# Patient Record
Sex: FEMALE | Race: WHITE | HISPANIC OR LATINO
[De-identification: names, ages, dates, MRNs, and addresses within clinical notes are randomized per-mention and may not be internally consistent; named-entity substitution may affect disease eponyms.]

---

## 2017-03-20 ENCOUNTER — RESULT REVIEW (OUTPATIENT)
Age: 47
End: 2017-03-20

## 2017-03-23 ENCOUNTER — APPOINTMENT (OUTPATIENT)
Dept: MAMMOGRAPHY | Facility: CLINIC | Age: 47
End: 2017-03-23

## 2017-03-23 ENCOUNTER — OUTPATIENT (OUTPATIENT)
Dept: OUTPATIENT SERVICES | Facility: HOSPITAL | Age: 47
LOS: 1 days | End: 2017-03-23
Payer: COMMERCIAL

## 2017-03-23 DIAGNOSIS — Z00.8 ENCOUNTER FOR OTHER GENERAL EXAMINATION: ICD-10-CM

## 2017-03-23 PROCEDURE — 77067 SCR MAMMO BI INCL CAD: CPT

## 2017-03-23 PROCEDURE — 77063 BREAST TOMOSYNTHESIS BI: CPT

## 2017-11-24 ENCOUNTER — OUTPATIENT (OUTPATIENT)
Dept: OUTPATIENT SERVICES | Facility: HOSPITAL | Age: 47
LOS: 1 days | Discharge: HOME | End: 2017-11-24
Payer: SUBSIDIZED

## 2017-11-24 DIAGNOSIS — R13.19 OTHER DYSPHAGIA: ICD-10-CM

## 2017-11-24 DIAGNOSIS — R13.13 DYSPHAGIA, PHARYNGEAL PHASE: ICD-10-CM

## 2017-11-24 PROCEDURE — 74220 X-RAY XM ESOPHAGUS 1CNTRST: CPT | Mod: 26

## 2018-01-08 ENCOUNTER — EMERGENCY (EMERGENCY)
Facility: HOSPITAL | Age: 48
LOS: 0 days | Discharge: HOME | End: 2018-01-08
Attending: EMERGENCY MEDICINE | Admitting: EMERGENCY MEDICINE
Payer: MEDICAID

## 2018-01-08 VITALS — HEIGHT: 65 IN | WEIGHT: 190.04 LBS

## 2018-01-08 VITALS
SYSTOLIC BLOOD PRESSURE: 99 MMHG | TEMPERATURE: 98 F | OXYGEN SATURATION: 98 % | HEART RATE: 87 BPM | DIASTOLIC BLOOD PRESSURE: 51 MMHG | RESPIRATION RATE: 15 BRPM

## 2018-01-08 DIAGNOSIS — D64.9 ANEMIA, UNSPECIFIED: ICD-10-CM

## 2018-01-08 DIAGNOSIS — R50.9 FEVER, UNSPECIFIED: ICD-10-CM

## 2018-01-08 DIAGNOSIS — N39.0 URINARY TRACT INFECTION, SITE NOT SPECIFIED: ICD-10-CM

## 2018-01-08 LAB
ALBUMIN SERPL ELPH-MCNC: 3.5 G/DL — SIGNIFICANT CHANGE UP (ref 3.3–5)
ALP SERPL-CCNC: 108 U/L — SIGNIFICANT CHANGE UP (ref 40–120)
ALT FLD-CCNC: 62 U/L — SIGNIFICANT CHANGE UP (ref 12–78)
ANION GAP SERPL CALC-SCNC: 8 MMOL/L — SIGNIFICANT CHANGE UP (ref 5–17)
APPEARANCE UR: CLEAR — SIGNIFICANT CHANGE UP
AST SERPL-CCNC: 56 U/L — HIGH (ref 15–37)
BACTERIA # UR AUTO: (no result)
BASOPHILS # BLD AUTO: 0.1 K/UL — SIGNIFICANT CHANGE UP (ref 0–0.2)
BILIRUB SERPL-MCNC: 0.5 MG/DL — SIGNIFICANT CHANGE UP (ref 0.2–1.2)
BILIRUB UR-MCNC: NEGATIVE — SIGNIFICANT CHANGE UP
BUN SERPL-MCNC: 11 MG/DL — SIGNIFICANT CHANGE UP (ref 7–23)
CALCIUM SERPL-MCNC: 8.5 MG/DL — SIGNIFICANT CHANGE UP (ref 8.5–10.1)
CHLORIDE SERPL-SCNC: 103 MMOL/L — SIGNIFICANT CHANGE UP (ref 96–108)
CO2 SERPL-SCNC: 23 MMOL/L — SIGNIFICANT CHANGE UP (ref 22–31)
COLOR SPEC: YELLOW — SIGNIFICANT CHANGE UP
COMMENT - URINE: SIGNIFICANT CHANGE UP
CREAT SERPL-MCNC: 0.77 MG/DL — SIGNIFICANT CHANGE UP (ref 0.5–1.3)
DIFF PNL FLD: (no result)
EOSINOPHIL # BLD AUTO: 0 K/UL — SIGNIFICANT CHANGE UP (ref 0–0.5)
GLUCOSE SERPL-MCNC: 104 MG/DL — HIGH (ref 70–99)
GLUCOSE UR QL: NEGATIVE MG/DL — SIGNIFICANT CHANGE UP
HCT VFR BLD CALC: 38.2 % — SIGNIFICANT CHANGE UP (ref 34.5–45)
HGB BLD-MCNC: 12.5 G/DL — SIGNIFICANT CHANGE UP (ref 11.5–15.5)
HYPERCHROMIA BLD QL AUTO: SLIGHT — SIGNIFICANT CHANGE UP
KETONES UR-MCNC: (no result)
LACTATE SERPL-SCNC: 1.2 MMOL/L — SIGNIFICANT CHANGE UP (ref 0.7–2)
LEUKOCYTE ESTERASE UR-ACNC: (no result)
LYMPHOCYTES # BLD AUTO: 1.2 K/UL — SIGNIFICANT CHANGE UP (ref 1–3.3)
LYMPHOCYTES # BLD AUTO: 9 % — LOW (ref 13–44)
MACROCYTES BLD QL: SLIGHT — SIGNIFICANT CHANGE UP
MANUAL DIF COMMENT BLD-IMP: SIGNIFICANT CHANGE UP
MCHC RBC-ENTMCNC: 29.1 PG — SIGNIFICANT CHANGE UP (ref 27–34)
MCHC RBC-ENTMCNC: 32.8 GM/DL — SIGNIFICANT CHANGE UP (ref 32–36)
MCV RBC AUTO: 88.7 FL — SIGNIFICANT CHANGE UP (ref 80–100)
MONOCYTES # BLD AUTO: 1.3 K/UL — HIGH (ref 0–0.9)
MONOCYTES NFR BLD AUTO: 3 % — SIGNIFICANT CHANGE UP (ref 2–14)
NEUTROPHILS # BLD AUTO: 16.2 K/UL — HIGH (ref 1.8–7.4)
NEUTROPHILS NFR BLD AUTO: 85 % — HIGH (ref 43–77)
NITRITE UR-MCNC: NEGATIVE — SIGNIFICANT CHANGE UP
PH UR: 6 — SIGNIFICANT CHANGE UP (ref 5–8)
PLAT MORPH BLD: NORMAL — SIGNIFICANT CHANGE UP
PLATELET # BLD AUTO: 223 K/UL — SIGNIFICANT CHANGE UP (ref 150–400)
POLYCHROMASIA BLD QL SMEAR: SLIGHT — SIGNIFICANT CHANGE UP
POTASSIUM SERPL-MCNC: 3.3 MMOL/L — LOW (ref 3.5–5.3)
POTASSIUM SERPL-SCNC: 3.3 MMOL/L — LOW (ref 3.5–5.3)
PROT SERPL-MCNC: 8.3 GM/DL — SIGNIFICANT CHANGE UP (ref 6–8.3)
PROT UR-MCNC: 30 MG/DL
RBC # BLD: 4.3 M/UL — SIGNIFICANT CHANGE UP (ref 3.8–5.2)
RBC # FLD: 18.9 % — HIGH (ref 10.3–14.5)
RBC BLD AUTO: SIGNIFICANT CHANGE UP
RBC CASTS # UR COMP ASSIST: (no result) /HPF (ref 0–4)
SODIUM SERPL-SCNC: 134 MMOL/L — LOW (ref 135–145)
SP GR SPEC: 1.01 — SIGNIFICANT CHANGE UP (ref 1.01–1.02)
UROBILINOGEN FLD QL: 1 MG/DL
VARIANT LYMPHS # BLD: 3 % — SIGNIFICANT CHANGE UP (ref 0–6)
WBC # BLD: 18.8 K/UL — HIGH (ref 3.8–10.5)
WBC # FLD AUTO: 18.8 K/UL — HIGH (ref 3.8–10.5)
WBC UR QL: SIGNIFICANT CHANGE UP

## 2018-01-08 PROCEDURE — 99285 EMERGENCY DEPT VISIT HI MDM: CPT

## 2018-01-08 PROCEDURE — 74177 CT ABD & PELVIS W/CONTRAST: CPT | Mod: 26

## 2018-01-08 PROCEDURE — 93010 ELECTROCARDIOGRAM REPORT: CPT

## 2018-01-08 PROCEDURE — 71046 X-RAY EXAM CHEST 2 VIEWS: CPT | Mod: 26

## 2018-01-08 RX ORDER — ACETAMINOPHEN 500 MG
1000 TABLET ORAL ONCE
Qty: 0 | Refills: 0 | Status: COMPLETED | OUTPATIENT
Start: 2018-01-08 | End: 2018-01-08

## 2018-01-08 RX ORDER — CEFTRIAXONE 500 MG/1
1 INJECTION, POWDER, FOR SOLUTION INTRAMUSCULAR; INTRAVENOUS ONCE
Qty: 0 | Refills: 0 | Status: DISCONTINUED | OUTPATIENT
Start: 2018-01-08 | End: 2018-01-08

## 2018-01-08 RX ORDER — SODIUM CHLORIDE 9 MG/ML
3 INJECTION INTRAMUSCULAR; INTRAVENOUS; SUBCUTANEOUS ONCE
Qty: 0 | Refills: 0 | Status: COMPLETED | OUTPATIENT
Start: 2018-01-08 | End: 2018-01-08

## 2018-01-08 RX ORDER — CEFTRIAXONE 500 MG/1
1000 INJECTION, POWDER, FOR SOLUTION INTRAMUSCULAR; INTRAVENOUS ONCE
Qty: 0 | Refills: 0 | Status: COMPLETED | OUTPATIENT
Start: 2018-01-08 | End: 2018-01-08

## 2018-01-08 RX ORDER — MORPHINE SULFATE 50 MG/1
4 CAPSULE, EXTENDED RELEASE ORAL ONCE
Qty: 0 | Refills: 0 | Status: DISCONTINUED | OUTPATIENT
Start: 2018-01-08 | End: 2018-01-08

## 2018-01-08 RX ORDER — SODIUM CHLORIDE 9 MG/ML
500 INJECTION INTRAMUSCULAR; INTRAVENOUS; SUBCUTANEOUS
Qty: 0 | Refills: 0 | Status: COMPLETED | OUTPATIENT
Start: 2018-01-08 | End: 2018-01-08

## 2018-01-08 RX ORDER — SODIUM CHLORIDE 9 MG/ML
500 INJECTION INTRAMUSCULAR; INTRAVENOUS; SUBCUTANEOUS ONCE
Qty: 0 | Refills: 0 | Status: COMPLETED | OUTPATIENT
Start: 2018-01-08 | End: 2018-01-08

## 2018-01-08 RX ORDER — CEPHALEXIN 500 MG
1 CAPSULE ORAL
Qty: 20 | Refills: 0 | OUTPATIENT
Start: 2018-01-08 | End: 2018-01-17

## 2018-01-08 RX ADMIN — SODIUM CHLORIDE 2000 MILLILITER(S): 9 INJECTION INTRAMUSCULAR; INTRAVENOUS; SUBCUTANEOUS at 08:25

## 2018-01-08 RX ADMIN — MORPHINE SULFATE 4 MILLIGRAM(S): 50 CAPSULE, EXTENDED RELEASE ORAL at 09:40

## 2018-01-08 RX ADMIN — SODIUM CHLORIDE 2000 MILLILITER(S): 9 INJECTION INTRAMUSCULAR; INTRAVENOUS; SUBCUTANEOUS at 08:27

## 2018-01-08 RX ADMIN — CEFTRIAXONE 1000 MILLIGRAM(S): 500 INJECTION, POWDER, FOR SOLUTION INTRAMUSCULAR; INTRAVENOUS at 09:07

## 2018-01-08 RX ADMIN — SODIUM CHLORIDE 2000 MILLILITER(S): 9 INJECTION INTRAMUSCULAR; INTRAVENOUS; SUBCUTANEOUS at 12:05

## 2018-01-08 RX ADMIN — SODIUM CHLORIDE 3 MILLILITER(S): 9 INJECTION INTRAMUSCULAR; INTRAVENOUS; SUBCUTANEOUS at 08:54

## 2018-01-08 RX ADMIN — Medication 1000 MILLIGRAM(S): at 09:06

## 2018-01-08 RX ADMIN — SODIUM CHLORIDE 2000 MILLILITER(S): 9 INJECTION INTRAMUSCULAR; INTRAVENOUS; SUBCUTANEOUS at 08:28

## 2018-01-08 RX ADMIN — SODIUM CHLORIDE 500 MILLILITER(S): 9 INJECTION INTRAMUSCULAR; INTRAVENOUS; SUBCUTANEOUS at 12:06

## 2018-01-08 RX ADMIN — SODIUM CHLORIDE 2000 MILLILITER(S): 9 INJECTION INTRAMUSCULAR; INTRAVENOUS; SUBCUTANEOUS at 08:26

## 2018-01-08 RX ADMIN — MORPHINE SULFATE 4 MILLIGRAM(S): 50 CAPSULE, EXTENDED RELEASE ORAL at 09:06

## 2018-01-08 NOTE — ED PROVIDER NOTE - MEDICAL DECISION MAKING DETAILS
46 yo female presents with fever and abdominal pain radiating to the L flank. R/o ppyelo. Labs, fluids, IV abx, ua, CT abd. -Alfredo Cruz PA-C

## 2018-01-08 NOTE — ED ADULT TRIAGE NOTE - CHIEF COMPLAINT QUOTE
Pt states fever, cough, sore throat and abdominal pain x 3 days. Took Tylenol last night with no relief

## 2018-01-08 NOTE — ED PROVIDER NOTE - ATTENDING CONTRIBUTION TO CARE
48 yo female pw fever and LLQ abdominal pain.  TTP of LLQ of abdomen. CT scan negative patient treated for UTI.

## 2018-01-08 NOTE — ED PROVIDER NOTE - OBJECTIVE STATEMENT
48 yo female with no significant PMH presents with fever, abdominal pain. Pt states symptoms started with fever of 102F friday going up to 107F with LLQ pain radiating to the L flank. Pt states also sore throat with lower substernal cp. Denies chills, sweating, sob, n/v/d/c, cough, hematuria, dysuria, frequency.

## 2018-01-09 LAB
CULTURE RESULTS: SIGNIFICANT CHANGE UP
SPECIMEN SOURCE: SIGNIFICANT CHANGE UP

## 2018-01-13 LAB
CULTURE RESULTS: SIGNIFICANT CHANGE UP
CULTURE RESULTS: SIGNIFICANT CHANGE UP
SPECIMEN SOURCE: SIGNIFICANT CHANGE UP
SPECIMEN SOURCE: SIGNIFICANT CHANGE UP

## 2018-02-07 ENCOUNTER — INPATIENT (INPATIENT)
Facility: HOSPITAL | Age: 48
LOS: 1 days | Discharge: HOME | End: 2018-02-09
Attending: FAMILY MEDICINE | Admitting: FAMILY MEDICINE
Payer: MEDICAID

## 2018-02-07 VITALS — WEIGHT: 190.04 LBS

## 2018-02-07 LAB
ALBUMIN SERPL ELPH-MCNC: 3.7 G/DL — SIGNIFICANT CHANGE UP (ref 3.3–5)
ALP SERPL-CCNC: 111 U/L — SIGNIFICANT CHANGE UP (ref 40–120)
ALT FLD-CCNC: 68 U/L — SIGNIFICANT CHANGE UP (ref 12–78)
ANION GAP SERPL CALC-SCNC: 7 MMOL/L — SIGNIFICANT CHANGE UP (ref 5–17)
AST SERPL-CCNC: 67 U/L — HIGH (ref 15–37)
BASOPHILS # BLD AUTO: 0.1 K/UL — SIGNIFICANT CHANGE UP (ref 0–0.2)
BASOPHILS NFR BLD AUTO: 0.5 % — SIGNIFICANT CHANGE UP (ref 0–2)
BILIRUB SERPL-MCNC: 0.6 MG/DL — SIGNIFICANT CHANGE UP (ref 0.2–1.2)
BUN SERPL-MCNC: 9 MG/DL — SIGNIFICANT CHANGE UP (ref 7–23)
CALCIUM SERPL-MCNC: 8.8 MG/DL — SIGNIFICANT CHANGE UP (ref 8.5–10.1)
CHLORIDE SERPL-SCNC: 104 MMOL/L — SIGNIFICANT CHANGE UP (ref 96–108)
CO2 SERPL-SCNC: 24 MMOL/L — SIGNIFICANT CHANGE UP (ref 22–31)
CREAT SERPL-MCNC: 0.73 MG/DL — SIGNIFICANT CHANGE UP (ref 0.5–1.3)
EOSINOPHIL # BLD AUTO: 0.1 K/UL — SIGNIFICANT CHANGE UP (ref 0–0.5)
EOSINOPHIL NFR BLD AUTO: 0.5 % — SIGNIFICANT CHANGE UP (ref 0–6)
GLUCOSE SERPL-MCNC: 92 MG/DL — SIGNIFICANT CHANGE UP (ref 70–99)
HCT VFR BLD CALC: 40.6 % — SIGNIFICANT CHANGE UP (ref 34.5–45)
HGB BLD-MCNC: 13 G/DL — SIGNIFICANT CHANGE UP (ref 11.5–15.5)
LACTATE SERPL-SCNC: 0.7 MMOL/L — SIGNIFICANT CHANGE UP (ref 0.7–2)
LYMPHOCYTES # BLD AUTO: 1.8 K/UL — SIGNIFICANT CHANGE UP (ref 1–3.3)
LYMPHOCYTES # BLD AUTO: 14.9 % — SIGNIFICANT CHANGE UP (ref 13–44)
MCHC RBC-ENTMCNC: 29.6 PG — SIGNIFICANT CHANGE UP (ref 27–34)
MCHC RBC-ENTMCNC: 32 GM/DL — SIGNIFICANT CHANGE UP (ref 32–36)
MCV RBC AUTO: 92.5 FL — SIGNIFICANT CHANGE UP (ref 80–100)
MONOCYTES # BLD AUTO: 0.6 K/UL — SIGNIFICANT CHANGE UP (ref 0–0.9)
MONOCYTES NFR BLD AUTO: 5.3 % — SIGNIFICANT CHANGE UP (ref 2–14)
NEUTROPHILS # BLD AUTO: 9.4 K/UL — HIGH (ref 1.8–7.4)
NEUTROPHILS NFR BLD AUTO: 78.8 % — HIGH (ref 43–77)
PLATELET # BLD AUTO: 285 K/UL — SIGNIFICANT CHANGE UP (ref 150–400)
POTASSIUM SERPL-MCNC: 4 MMOL/L — SIGNIFICANT CHANGE UP (ref 3.5–5.3)
POTASSIUM SERPL-SCNC: 4 MMOL/L — SIGNIFICANT CHANGE UP (ref 3.5–5.3)
PROT SERPL-MCNC: 9.1 GM/DL — HIGH (ref 6–8.3)
RBC # BLD: 4.39 M/UL — SIGNIFICANT CHANGE UP (ref 3.8–5.2)
RBC # FLD: 17.2 % — HIGH (ref 10.3–14.5)
S PYO AG SPEC QL IA: NEGATIVE — SIGNIFICANT CHANGE UP
SODIUM SERPL-SCNC: 135 MMOL/L — SIGNIFICANT CHANGE UP (ref 135–145)
WBC # BLD: 12 K/UL — HIGH (ref 3.8–10.5)
WBC # FLD AUTO: 12 K/UL — HIGH (ref 3.8–10.5)

## 2018-02-07 PROCEDURE — 99285 EMERGENCY DEPT VISIT HI MDM: CPT

## 2018-02-07 PROCEDURE — 93010 ELECTROCARDIOGRAM REPORT: CPT

## 2018-02-07 PROCEDURE — 70491 CT SOFT TISSUE NECK W/DYE: CPT | Mod: 26

## 2018-02-07 RX ORDER — VANCOMYCIN HCL 1 G
1000 VIAL (EA) INTRAVENOUS ONCE
Qty: 0 | Refills: 0 | Status: COMPLETED | OUTPATIENT
Start: 2018-02-07 | End: 2018-02-07

## 2018-02-07 RX ORDER — AMPICILLIN SODIUM AND SULBACTAM SODIUM 250; 125 MG/ML; MG/ML
1.5 INJECTION, POWDER, FOR SUSPENSION INTRAMUSCULAR; INTRAVENOUS EVERY 6 HOURS
Qty: 0 | Refills: 0 | Status: DISCONTINUED | OUTPATIENT
Start: 2018-02-07 | End: 2018-02-07

## 2018-02-07 RX ORDER — SODIUM CHLORIDE 9 MG/ML
3 INJECTION INTRAMUSCULAR; INTRAVENOUS; SUBCUTANEOUS ONCE
Qty: 0 | Refills: 0 | Status: COMPLETED | OUTPATIENT
Start: 2018-02-07 | End: 2018-02-07

## 2018-02-07 RX ORDER — DEXAMETHASONE 0.5 MG/5ML
10 ELIXIR ORAL ONCE
Qty: 0 | Refills: 0 | Status: COMPLETED | OUTPATIENT
Start: 2018-02-07 | End: 2018-02-07

## 2018-02-07 RX ORDER — AMPICILLIN SODIUM AND SULBACTAM SODIUM 250; 125 MG/ML; MG/ML
3 INJECTION, POWDER, FOR SUSPENSION INTRAMUSCULAR; INTRAVENOUS EVERY 6 HOURS
Qty: 0 | Refills: 0 | Status: DISCONTINUED | OUTPATIENT
Start: 2018-02-07 | End: 2018-02-09

## 2018-02-07 RX ORDER — FERROUS SULFATE 325(65) MG
1 TABLET ORAL
Qty: 0 | Refills: 0 | COMMUNITY

## 2018-02-07 RX ORDER — ONDANSETRON 8 MG/1
4 TABLET, FILM COATED ORAL EVERY 6 HOURS
Qty: 0 | Refills: 0 | Status: DISCONTINUED | OUTPATIENT
Start: 2018-02-07 | End: 2018-02-09

## 2018-02-07 RX ORDER — SODIUM CHLORIDE 9 MG/ML
1000 INJECTION INTRAMUSCULAR; INTRAVENOUS; SUBCUTANEOUS
Qty: 0 | Refills: 0 | Status: DISCONTINUED | OUTPATIENT
Start: 2018-02-07 | End: 2018-02-09

## 2018-02-07 RX ORDER — SODIUM CHLORIDE 9 MG/ML
500 INJECTION INTRAMUSCULAR; INTRAVENOUS; SUBCUTANEOUS
Qty: 0 | Refills: 0 | Status: COMPLETED | OUTPATIENT
Start: 2018-02-07 | End: 2018-02-07

## 2018-02-07 RX ORDER — CEFAZOLIN SODIUM 1 G
2000 VIAL (EA) INJECTION ONCE
Qty: 0 | Refills: 0 | Status: COMPLETED | OUTPATIENT
Start: 2018-02-07 | End: 2018-02-07

## 2018-02-07 RX ORDER — OMEPRAZOLE 10 MG/1
1 CAPSULE, DELAYED RELEASE ORAL
Qty: 0 | Refills: 0 | COMMUNITY

## 2018-02-07 RX ADMIN — Medication 40 MILLIGRAM(S): at 18:18

## 2018-02-07 RX ADMIN — AMPICILLIN SODIUM AND SULBACTAM SODIUM 200 GRAM(S): 250; 125 INJECTION, POWDER, FOR SUSPENSION INTRAMUSCULAR; INTRAVENOUS at 21:53

## 2018-02-07 RX ADMIN — Medication 100 MILLIGRAM(S): at 10:43

## 2018-02-07 RX ADMIN — Medication 102 MILLIGRAM(S): at 10:37

## 2018-02-07 RX ADMIN — SODIUM CHLORIDE 2000 MILLILITER(S): 9 INJECTION INTRAMUSCULAR; INTRAVENOUS; SUBCUTANEOUS at 10:44

## 2018-02-07 RX ADMIN — SODIUM CHLORIDE 75 MILLILITER(S): 9 INJECTION INTRAMUSCULAR; INTRAVENOUS; SUBCUTANEOUS at 18:12

## 2018-02-07 RX ADMIN — SODIUM CHLORIDE 2000 MILLILITER(S): 9 INJECTION INTRAMUSCULAR; INTRAVENOUS; SUBCUTANEOUS at 10:42

## 2018-02-07 RX ADMIN — Medication 250 MILLIGRAM(S): at 18:18

## 2018-02-07 RX ADMIN — SODIUM CHLORIDE 3 MILLILITER(S): 9 INJECTION INTRAMUSCULAR; INTRAVENOUS; SUBCUTANEOUS at 10:44

## 2018-02-07 RX ADMIN — SODIUM CHLORIDE 2000 MILLILITER(S): 9 INJECTION INTRAMUSCULAR; INTRAVENOUS; SUBCUTANEOUS at 10:12

## 2018-02-07 RX ADMIN — SODIUM CHLORIDE 2000 MILLILITER(S): 9 INJECTION INTRAMUSCULAR; INTRAVENOUS; SUBCUTANEOUS at 10:27

## 2018-02-07 RX ADMIN — Medication 200 MILLIGRAM(S): at 10:36

## 2018-02-07 RX ADMIN — SODIUM CHLORIDE 2000 MILLILITER(S): 9 INJECTION INTRAMUSCULAR; INTRAVENOUS; SUBCUTANEOUS at 10:57

## 2018-02-07 NOTE — H&P ADULT - NSHPPHYSICALEXAM_GEN_ALL_CORE
Vital Signs Last 24 Hrs  T(C): 37.2 (07 Feb 2018 11:31), Max: 37.5 (07 Feb 2018 09:38)  T(F): 99 (07 Feb 2018 11:31), Max: 99.5 (07 Feb 2018 09:38)  HR: 91 (07 Feb 2018 11:31) (91 - 99)  BP: 122/77 (07 Feb 2018 11:31) (122/77 - 129/83)  BP(mean): --  RR: 18 (07 Feb 2018 11:31) (18 - 18)  SpO2: 100% (07 Feb 2018 11:31) (99% - 100%)    physical:   General: NAD, comfortable  Neuro: AAOx3, no focal deficits  HEENT/Neck: left side peritonsillar swelling, ttp;  left oropharyngeal erythema, minimal edema    - dentures    - no tongue swelling, airway patent   Respiratory: CTA b/l, no w/r/r  Cardiovascular: S1 and S2, RRR  Gastrointestinal: +BS, soft, NTND  Extremities: No c/c/e  Vascular: 2+ peripheral pulses  Musculoskeletal: full ROM  Skin: No rashes

## 2018-02-07 NOTE — ED ADULT NURSE NOTE - CHIEF COMPLAINT QUOTE
Pt presents to ED c/o sore throat and difficulty swallowing since Sunday. Pt reports she was seen at [facility] and given abx on Monday with no relief. Pt handling secretions in triage. Pt O2 sat 99% on room air in triage. Pt reports she hasn't been able to eat for 3 days bc of difficulty swallowing.

## 2018-02-07 NOTE — ED ADULT TRIAGE NOTE - CHIEF COMPLAINT QUOTE
Pt presents to ED c/o sore throat and difficulty swallowing since Sunday. Pt reports she was seen at [facility] and given abx on Monday with no relief. Pt handling secretions in triage. Pt O2 sat 99% on room air in triage. Pt reports she hasn't been able to eat for 3 days bc of difficulty swallowing.

## 2018-02-07 NOTE — H&P ADULT - NSHPLABSRESULTS_GEN_ALL_CORE
13.0   12.0  )-----------( 285      ( 07 Feb 2018 10:08 )             40.6     02-07    135  |  104  |  9   ----------------------------<  92  4.0   |  24  |  0.73    Ca    8.8      07 Feb 2018 10:08    TPro  9.1<H>  /  Alb  3.7  /  TBili  0.6  /  DBili  x   /  AST  67<H>  /  ALT  68  /  AlkPhos  111  02-07    < from: CT Neck Soft Tissue w/ IV Cont (02.07.18 @ 11:31) >    FINDINGS: There is a low attenuation rim-enhancing collection within the   left peritonsillar region which measures 2.5 x 2.5 x 2.8 cm. (AP x TRV x   CC). There is surrounding inflammatory change and edema tracking along   the left hypopharynx and also superiorly into the left nasopharynx. The   airway is shifted to the right but remains patent. There are associated   enlarged left-sided cervical lymph nodes which are reactive.    The larynx and trachea are within normal limits.    The bilateral parotid and submandibular glands appear unremarkable.   Thyroid gland is within normal limits.    No acute osseous or vascular abnormalities are imaged. There is minimal   biapical pleural thickening and/or scarring.    Impression: Fairly large left-sided peritonsillar abscess with associated   cervical lymphadenopathy. Patent airway.    Dr. Loi Diamond discussed findings with Dr. Mak on 2/7/2018 at 11:25 AM    < end of copied text >      MEDICATIONS  (STANDING):  ampicillin/sulbactam  IVPB 1.5 Gram(s) IV Intermittent every 6 hours  sodium chloride 0.9%. 1000 milliLiter(s) (75 mL/Hr) IV Continuous <Continuous>  vancomycin  IVPB 1000 milliGRAM(s) IV Intermittent once    MEDICATIONS  (PRN):  ondansetron Injectable 4 milliGRAM(s) IV Push every 6 hours PRN Nausea and/or Vomiting      ASSESSMENT and PLAN:  47y female w/     *left peritonsillar abscess/edema/cervical lymphadenopathy  - s/p I&D of abscess and aspiration of ~2-3cc pus for culture  - ENT consult appreciated   - start IV vanco and unasyn for now  - abscess culture and blood cultures pending  - Gp A strep culture pending (rapid strep neg)  - IVF x 1 liter, attempt clear liquid diet  - ID consult      *leukocytosis, low grade temp  - due to above  - not septic  - normal lactate     *GERD  - ppi    *dvt px  - ambulate 13.0   12.0  )-----------( 285      ( 07 Feb 2018 10:08 )             40.6     02-07    135  |  104  |  9   ----------------------------<  92  4.0   |  24  |  0.73    Ca    8.8      07 Feb 2018 10:08    TPro  9.1<H>  /  Alb  3.7  /  TBili  0.6  /  DBili  x   /  AST  67<H>  /  ALT  68  /  AlkPhos  111  02-07    < from: CT Neck Soft Tissue w/ IV Cont (02.07.18 @ 11:31) >    FINDINGS: There is a low attenuation rim-enhancing collection within the   left peritonsillar region which measures 2.5 x 2.5 x 2.8 cm. (AP x TRV x   CC). There is surrounding inflammatory change and edema tracking along   the left hypopharynx and also superiorly into the left nasopharynx. The   airway is shifted to the right but remains patent. There are associated   enlarged left-sided cervical lymph nodes which are reactive.    The larynx and trachea are within normal limits.    The bilateral parotid and submandibular glands appear unremarkable.   Thyroid gland is within normal limits.    No acute osseous or vascular abnormalities are imaged. There is minimal   biapical pleural thickening and/or scarring.    Impression: Fairly large left-sided peritonsillar abscess with associated   cervical lymphadenopathy. Patent airway.    Dr. Loi Diamond discussed findings with Dr. Mak on 2/7/2018 at 11:25 AM    < end of copied text >      MEDICATIONS  (STANDING):  ampicillin/sulbactam  IVPB 1.5 Gram(s) IV Intermittent every 6 hours  sodium chloride 0.9%. 1000 milliLiter(s) (75 mL/Hr) IV Continuous <Continuous>  vancomycin  IVPB 1000 milliGRAM(s) IV Intermittent once    MEDICATIONS  (PRN):  ondansetron Injectable 4 milliGRAM(s) IV Push every 6 hours PRN Nausea and/or Vomiting      ASSESSMENT and PLAN:  47y female w/     *left peritonsillar abscess/edema/cervical lymphadenopathy  - s/p I&D of abscess and aspiration of ~2-3cc pus for culture  - ENT consult appreciated   - IV steroids for 1-2d for edema  - start IV vanco and unasyn pending abscess culture and blood cultures  - Gp A strep culture pending (rapid strep neg)  - IVF x 1 liter, attempt clear liquid diet  - ID consult      *leukocytosis, low grade temp  - due to above  - not septic  - normal lactate     *GERD  - ppi    *dvt px  - ambulate 13.0   12.0  )-----------( 285      ( 07 Feb 2018 10:08 )             40.6     02-07    135  |  104  |  9   ----------------------------<  92  4.0   |  24  |  0.73    Ca    8.8      07 Feb 2018 10:08    TPro  9.1<H>  /  Alb  3.7  /  TBili  0.6  /  DBili  x   /  AST  67<H>  /  ALT  68  /  AlkPhos  111  02-07    < from: CT Neck Soft Tissue w/ IV Cont (02.07.18 @ 11:31) >    FINDINGS: There is a low attenuation rim-enhancing collection within the   left peritonsillar region which measures 2.5 x 2.5 x 2.8 cm. (AP x TRV x   CC). There is surrounding inflammatory change and edema tracking along   the left hypopharynx and also superiorly into the left nasopharynx. The   airway is shifted to the right but remains patent. There are associated   enlarged left-sided cervical lymph nodes which are reactive.    The larynx and trachea are within normal limits.    The bilateral parotid and submandibular glands appear unremarkable.   Thyroid gland is within normal limits.    No acute osseous or vascular abnormalities are imaged. There is minimal   biapical pleural thickening and/or scarring.    Impression: Fairly large left-sided peritonsillar abscess with associated   cervical lymphadenopathy. Patent airway.    Dr. Loi Diamond discussed findings with Dr. Mak on 2/7/2018 at 11:25 AM    < end of copied text >      MEDICATIONS  (STANDING):  ampicillin/sulbactam  IVPB 1.5 Gram(s) IV Intermittent every 6 hours  sodium chloride 0.9%. 1000 milliLiter(s) (75 mL/Hr) IV Continuous <Continuous>  vancomycin  IVPB 1000 milliGRAM(s) IV Intermittent once    MEDICATIONS  (PRN):  ondansetron Injectable 4 milliGRAM(s) IV Push every 6 hours PRN Nausea and/or Vomiting      ASSESSMENT and PLAN:  47y female w/     *left peritonsillar abscess/edema/cervical lymphadenopathy  - s/p I&D of abscess and aspiration of ~2-3cc pus for culture  - ENT consult appreciated   - IV steroids for 1-2d for edema  - start IV vanco and unasyn pending abscess culture and blood cultures  - Gp A strep culture pending (rapid strep neg)  - IVF x 1 liter, attempt clear liquid diet  - ID consult      *leukocytosis, low grade temp  - due to above  - not septic  - normal lactate     *mild elevation AST   labs pending, repeat cmp in am  doubt ebv but will check for completeness (also pt states son in ED few days ago w/ viral infection)      *GERD  - ppi    *dvt px  - ambulate

## 2018-02-07 NOTE — ED PROVIDER NOTE - OBJECTIVE STATEMENT
46 y/o F presents to ED c/o sore throat x3 days with difficulty swallowing and difficulty opening mouth. Pt reports she has had abscess 1 month ago.   Reports she was seen at [facility] and given abx x2 days ago with no relief. Pt reports she hasn't been able to eat for 3 days bc of difficulty swallowing. No respiratory distress, no drooling, no fever, or any other acute complaints.

## 2018-02-07 NOTE — ED PROVIDER NOTE - MEDICAL DECISION MAKING DETAILS
46 y/o F, w/ peritonsillar abscess, plan for labs, CT, ENT. 48 y/o F, w/ peritonsillar abscess, plan for labs, CT, ENT.    Left PTA drained by ENT.  Recommended 24 hour observation, IV antibiotics.  Admit to medicine.

## 2018-02-07 NOTE — H&P ADULT - NSHPREVIEWOFSYSTEMS_GEN_ALL_CORE
General: No weakness, fevers, chills  HEENT: left side neck/face pain; sore throate and painful swallowing; no visual changes, no hearing loss , no HA  Resp: No cough, wheezing, hemoptysis; No shortness of breath  CV: No chest pain or palpitations  GI: No abdominal pain, no n/v/d, no blood in stool  : No f/u/d  Neuro: No weakness or numbness  MSK: No myalgias, arthralgias  SKIN: No itching, rashes, lesions  All other ROS negative unless indicated above.

## 2018-02-07 NOTE — CONSULT NOTE ADULT - SUBJECTIVE AND OBJECTIVE BOX
· HPI Objective Statement: 46 y/o F presents to ED c/o sore throat x3 days with difficulty swallowing and difficulty opening mouth. Pt reports she has had abscess 1 month ago.  Reports she was seen at [facility] and given abx x2 days ago with no relief. Pt reports she hasn't been able to eat for 3 days bc of difficulty swallowing. No respiratory distress, no drooling, no fever, or any other acute complaints.	  · Presenting Symptoms: PAIN	  · Negative Findings: no nausea	  · Timing: gradual onset	  · Duration: day(s)	  · Severity: MILD	    PAST MEDICAL/SURGICAL/FAMILY/SOCIAL HISTORY:    Past Medical History:  Anemia, unspecified type.     Tobacco Usage:  · Tobacco Usage	Never smoker	    ALLERGIES AND HOME MEDICATIONS:   Allergies:        Allergies:  	No Known Allergies:     Home Medications:   * Patient Currently Takes Medications as of 08-Jan-2018 12:19 documented in Structured Notes  · 	Keflex 500 mg oral capsule: 1 cap(s) orally every 12 hours     REVIEW OF SYSTEMS:    Review of Systems:  · CONSTITUTIONAL: no fever and no chills.	  · ENMT: - - -	  · Mouth/Throat [+]: DIFFICULTY SWALLOWING	  · ROS STATEMENT: all other ROS negative except as per HPI	      Vital Signs Last 24 Hrs  T(C): 37.2 (07 Feb 2018 11:31), Max: 37.5 (07 Feb 2018 09:38)  T(F): 99 (07 Feb 2018 11:31), Max: 99.5 (07 Feb 2018 09:38)  HR: 91 (07 Feb 2018 11:31) (91 - 99)  BP: 122/77 (07 Feb 2018 11:31) (122/77 - 129/83)  BP(mean): --  RR: 18 (07 Feb 2018 11:31) (18 - 18)  SpO2: 100% (07 Feb 2018 11:31) (99% - 100%)    PHYSICAL EXAM:   · CONSTITUTIONAL: Well appearing, well nourished, awake, alert, oriented to person, place, time/situation and in no apparent distress.	  · Ears: auricles, EACs, TM's and ME's normal au	  · nose: No pus or polyps, normal mucose.	  · oc/op: Normal tongue and FOM. no stridor, +trismus, Airway patent, left-sided peritonsillar abscess with bulge and right sided uvular deviation	  · RESPIRATORY: Normal breathing, no increased WOB, no distress..	  	  	  	  	      Procedure: Verbal consent obtained for I%D PTA..  1% lido with epi injected into bulge of left PTA.  22g needle used to find and aspirate ~2-3cc of pus for culture.  15 blade used to incise abcess and drain pocket with suction.  Patient tolerated well. EBL minimal.  Saline gargles clear at end.  No complications.    CBC Full  -  ( 07 Feb 2018 10:08 )  WBC Count : 12.0 K/uL  Hemoglobin : 13.0 g/dL  Hematocrit : 40.6 %  Platelet Count - Automated : 285 K/uL  Mean Cell Volume : 92.5 fl  Mean Cell Hemoglobin : 29.6 pg  Mean Cell Hemoglobin Concentration : 32.0 gm/dL  Auto Neutrophil # : 9.4 K/uL  Auto Lymphocyte # : 1.8 K/uL  Auto Monocyte # : 0.6 K/uL  Auto Eosinophil # : 0.1 K/uL  Auto Basophil # : 0.1 K/uL  Auto Neutrophil % : 78.8 %  Auto Lymphocyte % : 14.9 %  Auto Monocyte % : 5.3 %  Auto Eosinophil % : 0.5 %  Auto Basophil % : 0.5 %      EXAM:  CT NECK SOFT TISSUE IC                            PROCEDURE DATE:  02/07/2018          INTERPRETATION:  .    CLINICAL INFORMATION: Neck pain and swelling. Possible left-sided abscess.    TECHNIQUE: Axial sections were obtained from the skull base to the   sternum after the administration of intravenous contrast. 90 cc's of   Omnipaque 350 was administered and 10 cc's were discarded. Sagittal and   Coronal reformations were obtained from the source data.     COMPARISON: No prior CT examinations of the neck are available for   comparison.    FINDINGS: There is a low attenuation rim-enhancing collection within the   left peritonsillar region which measures 2.5 x 2.5 x 2.8 cm. (AP x TRV x   CC). There is surrounding inflammatory change and edema tracking along   the left hypopharynx and also superiorly into the left nasopharynx. The   airway is shifted to the right but remains patent. There are associated   enlarged left-sided cervical lymph nodes which are reactive.    The larynx and trachea are within normal limits.    The bilateral parotid and submandibular glands appear unremarkable.   Thyroid gland is within normal limits.    No acute osseous or vascular abnormalities are imaged. There is minimal   biapical pleural thickening and/or scarring.    Impression: Fairly large left-sided peritonsillar abscess with associated   cervical lymphadenopathy. Patent airway.    Dr. Loi Mcallister discussed findings with Dr. Mak on 2/7/2018 at 11:25 AM    LOI MCALLISTER   This document has been electronically signed. Feb 7 2018 11:26AM        A/p: S/p I&D left PTA.  recommend 24hr admission for IV antibiotics given significant cellulitis noted on CT.  Culture sent.  Antibiotics per ID or medicine.  Reconsult as needed.  Expect improvement with medicine now that abscess is drained.

## 2018-02-07 NOTE — H&P ADULT - HISTORY OF PRESENT ILLNESS
47y female w/ PMH GERD presents to ED w/ sore throat for past few days.  As per family at bedside translating, she had left side face pain for 1 month which worsened over past week.  She went to see NP Vonnie Hernandez and was prescribed Augmentin on 2/5 w/ no improvement of Sx.   Also c/o difficulty swallowing and opening mouth.  Hasn't been able to eat for days.  Denies fevers.  Took tylenol at home for pain.  No shortness of breath. No dental procedures- she does not have a dentist and she's had dentures for years.  Family states she had similar issue on right side ~5yrs ago.      In ED she was given IV ancef, clindamycin and IV decadron.  ENT performed bedside procedure- I&D of abscess and aspirated ~2-3cc of pus for culture.  Pt reports feeling much better and wants to go home.  Discussed importance of monitoring overnight, IV abx and pending cultures. 47y female w/ PMH GERD presents to ED w/ sore throat for past few days.  As per family at bedside translating, she had left side face pain for 1 month which worsened over past week.  She went to see NP Vonnie Hernandez and was prescribed Augmentin on 2/5 w/ no improvement of Sx (and no rashes following augmentin use).   Also c/o difficulty swallowing and opening mouth.  Hasn't been able to eat for days.  Denies fevers.  Took tylenol at home for pain.  No shortness of breath. No dental procedures- she does not have a dentist and she's had dentures for years.  Family states she had similar issue on right side ~5yrs ago.      In ED she was given IV ancef, clindamycin and IV decadron.  ENT performed bedside procedure- I&D of abscess and aspirated ~2-3cc of pus for culture.  Pt reports feeling much better and wants to go home.  Discussed importance of monitoring overnight, IV abx and pending cultures.

## 2018-02-08 LAB
ALBUMIN SERPL ELPH-MCNC: 3.3 G/DL — SIGNIFICANT CHANGE UP (ref 3.3–5)
ALP SERPL-CCNC: 114 U/L — SIGNIFICANT CHANGE UP (ref 40–120)
ALT FLD-CCNC: 65 U/L — SIGNIFICANT CHANGE UP (ref 12–78)
ANION GAP SERPL CALC-SCNC: 6 MMOL/L — SIGNIFICANT CHANGE UP (ref 5–17)
AST SERPL-CCNC: 45 U/L — HIGH (ref 15–37)
BASOPHILS # BLD AUTO: 0 K/UL — SIGNIFICANT CHANGE UP (ref 0–0.2)
BASOPHILS NFR BLD AUTO: 0.2 % — SIGNIFICANT CHANGE UP (ref 0–2)
BILIRUB SERPL-MCNC: 0.3 MG/DL — SIGNIFICANT CHANGE UP (ref 0.2–1.2)
BUN SERPL-MCNC: 12 MG/DL — SIGNIFICANT CHANGE UP (ref 7–23)
CALCIUM SERPL-MCNC: 8.5 MG/DL — SIGNIFICANT CHANGE UP (ref 8.5–10.1)
CHLORIDE SERPL-SCNC: 108 MMOL/L — SIGNIFICANT CHANGE UP (ref 96–108)
CO2 SERPL-SCNC: 25 MMOL/L — SIGNIFICANT CHANGE UP (ref 22–31)
CREAT SERPL-MCNC: 0.62 MG/DL — SIGNIFICANT CHANGE UP (ref 0.5–1.3)
EBV EA AB SER IA-ACNC: 7.2 U/ML — SIGNIFICANT CHANGE UP
EBV EA AB TITR SER IF: POSITIVE
EBV EA IGG SER-ACNC: NEGATIVE — SIGNIFICANT CHANGE UP
EBV NA IGG SER IA-ACNC: 157 U/ML — HIGH
EBV PATRN SPEC IB-IMP: SIGNIFICANT CHANGE UP
EBV VCA IGG AVIDITY SER QL IA: POSITIVE
EBV VCA IGM SER IA-ACNC: 135 U/ML — HIGH
EBV VCA IGM SER IA-ACNC: <10 U/ML — SIGNIFICANT CHANGE UP
EBV VCA IGM TITR FLD: NEGATIVE — SIGNIFICANT CHANGE UP
EOSINOPHIL # BLD AUTO: 0 K/UL — SIGNIFICANT CHANGE UP (ref 0–0.5)
EOSINOPHIL NFR BLD AUTO: 0 % — SIGNIFICANT CHANGE UP (ref 0–6)
GLUCOSE SERPL-MCNC: 124 MG/DL — HIGH (ref 70–99)
HAV IGM SER-ACNC: SIGNIFICANT CHANGE UP
HBV CORE IGM SER-ACNC: SIGNIFICANT CHANGE UP
HBV SURFACE AG SER-ACNC: SIGNIFICANT CHANGE UP
HCT VFR BLD CALC: 37.3 % — SIGNIFICANT CHANGE UP (ref 34.5–45)
HCV AB S/CO SERPL IA: 0.16 S/CO — SIGNIFICANT CHANGE UP
HCV AB SERPL-IMP: SIGNIFICANT CHANGE UP
HGB BLD-MCNC: 12.2 G/DL — SIGNIFICANT CHANGE UP (ref 11.5–15.5)
LYMPHOCYTES # BLD AUTO: 1.2 K/UL — SIGNIFICANT CHANGE UP (ref 1–3.3)
LYMPHOCYTES # BLD AUTO: 9.9 % — LOW (ref 13–44)
MCHC RBC-ENTMCNC: 30 PG — SIGNIFICANT CHANGE UP (ref 27–34)
MCHC RBC-ENTMCNC: 32.7 GM/DL — SIGNIFICANT CHANGE UP (ref 32–36)
MCV RBC AUTO: 91.8 FL — SIGNIFICANT CHANGE UP (ref 80–100)
MONOCYTES # BLD AUTO: 0.3 K/UL — SIGNIFICANT CHANGE UP (ref 0–0.9)
MONOCYTES NFR BLD AUTO: 2.4 % — SIGNIFICANT CHANGE UP (ref 2–14)
NEUTROPHILS # BLD AUTO: 11 K/UL — HIGH (ref 1.8–7.4)
NEUTROPHILS NFR BLD AUTO: 87.5 % — HIGH (ref 43–77)
PLATELET # BLD AUTO: 313 K/UL — SIGNIFICANT CHANGE UP (ref 150–400)
POTASSIUM SERPL-MCNC: 3.8 MMOL/L — SIGNIFICANT CHANGE UP (ref 3.5–5.3)
POTASSIUM SERPL-SCNC: 3.8 MMOL/L — SIGNIFICANT CHANGE UP (ref 3.5–5.3)
PROT SERPL-MCNC: 8.4 GM/DL — HIGH (ref 6–8.3)
RBC # BLD: 4.06 M/UL — SIGNIFICANT CHANGE UP (ref 3.8–5.2)
RBC # FLD: 16.9 % — HIGH (ref 10.3–14.5)
SODIUM SERPL-SCNC: 139 MMOL/L — SIGNIFICANT CHANGE UP (ref 135–145)
WBC # BLD: 12.6 K/UL — HIGH (ref 3.8–10.5)
WBC # FLD AUTO: 12.6 K/UL — HIGH (ref 3.8–10.5)

## 2018-02-08 RX ADMIN — AMPICILLIN SODIUM AND SULBACTAM SODIUM 200 GRAM(S): 250; 125 INJECTION, POWDER, FOR SUSPENSION INTRAMUSCULAR; INTRAVENOUS at 05:55

## 2018-02-08 RX ADMIN — AMPICILLIN SODIUM AND SULBACTAM SODIUM 200 GRAM(S): 250; 125 INJECTION, POWDER, FOR SUSPENSION INTRAMUSCULAR; INTRAVENOUS at 12:13

## 2018-02-08 RX ADMIN — AMPICILLIN SODIUM AND SULBACTAM SODIUM 200 GRAM(S): 250; 125 INJECTION, POWDER, FOR SUSPENSION INTRAMUSCULAR; INTRAVENOUS at 02:39

## 2018-02-08 RX ADMIN — Medication 40 MILLIGRAM(S): at 18:34

## 2018-02-08 RX ADMIN — AMPICILLIN SODIUM AND SULBACTAM SODIUM 200 GRAM(S): 250; 125 INJECTION, POWDER, FOR SUSPENSION INTRAMUSCULAR; INTRAVENOUS at 21:24

## 2018-02-08 RX ADMIN — Medication 40 MILLIGRAM(S): at 05:55

## 2018-02-08 NOTE — PROGRESS NOTE ADULT - ASSESSMENT
ASSESSMENT and PLAN:  47y female w/     *left peritonsillar abscess/edema/cervical lymphadenopathy  - s/p I&D of abscess and aspiration of ~2-3cc pus for culture  - ENT consult appreciated   - IV steroids for 1-2d for edema, day 2  - continue IV unasyn   - follow up abscess culture and blood cultures  - Gp A strep culture pending (rapid strep neg)  - IVF x 1 liter, clear liquid diet  - ID consult appreciated    *leukocytosis, low grade temp  - due to above  - not septic  - normal lactate     *mild elevation AST  labs pending, repeat cmp in am  doubt ebv but will check for completeness (also pt states son in ED few days ago w/ viral infection)    *GERD  - ppi    *dvt px  - ambulate ASSESSMENT and PLAN:  47y female w/     *left peritonsillar abscess/edema/cervical lymphadenopathy  - s/p I&D of abscess and aspiration of ~2-3cc pus for culture  - ENT consult appreciated   - IV steroids for 1-2d for edema, day 2  - continue IV unasyn   - follow up abscess culture and blood cultures  - Gp A strep culture pending (rapid strep neg)  - IVF x 1 liter, clear liquid diet  - ID consult appreciated  - will need to f/u with ENT Dr Del Castillo as an outpatient    *leukocytosis, low grade temp  - due to above  - not septic  - normal lactate     *mild elevation AST  doubt ebv but will check for completeness (also pt states son in ED few days ago w/ viral infection)  - EBV IGG+ no acute infection     *GERD  - ppi    *dvt px  - ambulate    [place] interpreted utilized [id number]. all of their concerns were addressed.

## 2018-02-08 NOTE — PROGRESS NOTE ADULT - ATTENDING COMMENTS
Patient seen and examined with EMI Garibay.  Agree with physical exam and assessment and plan.  phone used   - plan to continue IV abx given pt remains febrile  - tolerating clears and will advance to full liquid.  likely home in AM with ENT outpt f/u

## 2018-02-08 NOTE — CONSULT NOTE ADULT - SUBJECTIVE AND OBJECTIVE BOX
Chief complaint: sore throat, fever  HPI: 47y female w/ h/o GERD, anemia is admitted for sore throat and fever 3 days PTA. As per family at bedside translating, the patient had the pain inside the mouth for a month PTA and resolved after visiting  ED. The pain returns and worsening 3 days PTA. Pt also reports fever over 100 at home a couple days PTA. Pt states it was too painful that she could not open her house and eat. Pt went to [facility] and was given Augmentin for the pain with no improvement. Pt denies chills, nausea, vomiting, SOB PTA.    In ED she was given IV ancef, clindamycin and IV decadron.  ENT performed bedside procedure- I&D of abscess and aspirated ~2-3cc of pus for culture.       REVIEW OF SYSTEMS: all other review of systems are negative except as HPI    PAST MEDICAL HISTORY:  Anemia, unspecified type  GERD     Past Surgical History:  No significant past surgical history    Family History:   No pertinent family history in first degree relatives.    Social History:  lives at home w/ family- full ADLs  denies etoh or tobacco use    Allergies    No Known Allergies    Intolerances      MEDICATIONS  (STANDING):  ampicillin/sulbactam  IVPB 3 Gram(s) IV Intermittent every 6 hours  methylPREDNISolone sodium succinate Injectable 40 milliGRAM(s) IV Push two times a day  sodium chloride 0.9%. 1000 milliLiter(s) IV Continuous <Continuous>    MEDICATIONS  (PRN):  ondansetron Injectable 4 milliGRAM(s) IV Push every 6 hours PRN Nausea and/or Vomiting      VITALS:  Vital Signs Last 24 Hrs  T(C): 36.9 (02-08-18 @ 05:47), Max: 37.2 (02-07-18 @ 11:31)  T(F): 98.5 (02-08-18 @ 05:47), Max: 99 (02-07-18 @ 11:31)  HR: 79 (02-08-18 @ 05:47) (79 - 93)  BP: 116/58 (02-08-18 @ 05:47) (104/55 - 122/77)  BP(mean): --  RR: 17 (02-08-18 @ 05:47) (16 - 18)  SpO2: 99% (02-08-18 @ 05:47) (99% - 100%)      PHYSICAL EXAM:  Constitutional: comfortable, NAD  HEENT: NC/AT, EOMI, PERRLA, left side peritonsillar swelling, tenderness to palpation, left oropharyngeal erythema  Neck: supple, tenderness to palpation of the left side   Back: no tenderness  Respiratory: clear  Cardiovascular: S1S2 regular, no murmurs  Abdomen: soft, lower mid abdominal tenderness, not distended, positive BS  Genitourinary: deferred  Rectal: deferred  Musculoskeletal: no muscle tenderness, no joint swelling or tenderness  Extremities: no pedal edema  Neurological: AxOx3, moving all extremities, no focal deficits  Skin: no rashes          LABS:                          12.2   12.6  )-----------( 313      ( 08 Feb 2018 07:24 )             37.3       02-08    139  |  108  |  12  ----------------------------<  124<H>  3.8   |  25  |  0.62    Ca    8.5      08 Feb 2018 07:24    TPro  8.4<H>  /  Alb  3.3  /  TBili  0.3  /  DBili  x   /  AST  45<H>  /  ALT  65  /  AlkPhos  114  02-08          .Blood Blood-Peripheral  01-08 @ 08:42   No growth at 5 days.  --  --      RADIOLOGY:    < from: CT Neck Soft Tissue w/ IV Cont (02.07.18 @ 11:31) >    EXAM:  CT NECK SOFT TISSUE IC                            PROCEDURE DATE:  02/07/2018          INTERPRETATION:  .    CLINICAL INFORMATION: Neck pain and swelling. Possible left-sided abscess.    TECHNIQUE: Axial sections were obtained from the skullbase to the   sternum after the administration of intravenous contrast. 90 cc's of   Omnipaque 350 was administered and 10 cc's were discarded. Sagittal and   Coronal reformations were obtained from the source data.     COMPARISON: No prior CT examinations of the neck are available for   comparison.    FINDINGS: There is a low attenuation rim-enhancing collection within the   left peritonsillar region which measures 2.5 x 2.5 x 2.8 cm. (AP x TRV x   CC). There is surrounding inflammatory change and edema tracking along   the left hypopharynx and also superiorly into the left nasopharynx. The   airway is shifted to the right but remains patent. There are associated   enlarged left-sided cervical lymph nodes which are reactive.    The larynx and trachea are within normal limits.    The bilateral parotid and submandibular glands appear unremarkable.   Thyroid gland is within normal limits.    No acute osseous or vascular abnormalities are imaged. There is minimal   biapical pleural thickening and/or scarring.    Impression: Fairly large left-sided peritonsillar abscess with associated   cervical lymphadenopathy. Patent airway. Chief complaint: sore throat, fever  HPI:   48 y/o female with h/o GERD, anemia was admitted on 2/7 fora sore throat and fever x 3 days PTA. The patient had pain inside the mouth for a month PTA, then resolved, then recurred 3 days PTA. Pt also reports fever over 100F at home a couple days PTA. Pt states it was too painful to open her mouth and had difficulty eating. Pt went to [facility] and was given Augmentin for the pain with no improvement. Pt denies chills, nausea, vomiting, SOB PTA. In ED she was given IV ancef, clindamycin and IV decadron.      ENT performed I&D of peritonsillar abscess and aspirated ~2-3cc of pus for culture.       REVIEW OF SYSTEMS: all other review of systems are negative except as HPI    PAST MEDICAL HISTORY:  Anemia, unspecified type  GERD     Past Surgical History:  No significant past surgical history    Family History:   No pertinent family history in first degree relatives.    Social History:  lives at home w/ family- full ADLs  denies etoh or tobacco use    Allergies    No Known Allergies    Intolerances      MEDICATIONS  (STANDING):  ampicillin/sulbactam  IVPB 3 Gram(s) IV Intermittent every 6 hours  methylPREDNISolone sodium succinate Injectable 40 milliGRAM(s) IV Push two times a day  sodium chloride 0.9%. 1000 milliLiter(s) IV Continuous <Continuous>    MEDICATIONS  (PRN):  ondansetron Injectable 4 milliGRAM(s) IV Push every 6 hours PRN Nausea and/or Vomiting      VITALS:  Vital Signs Last 24 Hrs  T(C): 36.9 (02-08-18 @ 05:47), Max: 37.2 (02-07-18 @ 11:31)  T(F): 98.5 (02-08-18 @ 05:47), Max: 99 (02-07-18 @ 11:31)  HR: 79 (02-08-18 @ 05:47) (79 - 93)  BP: 116/58 (02-08-18 @ 05:47) (104/55 - 122/77)  BP(mean): --  RR: 17 (02-08-18 @ 05:47) (16 - 18)  SpO2: 99% (02-08-18 @ 05:47) (99% - 100%)      PHYSICAL EXAM:  Constitutional: comfortable, NAD  HEENT: NC/AT, EOMI, PERRLA, left side peritonsillar swelling, left oropharyngeal erythema  Neck: supple, tenderness to palpation of the left side of neck; difficult to palpate ln due to edema   Back: no tenderness  Respiratory: clear  Cardiovascular: S1S2 regular, no murmurs  Abdomen: soft, lower mid abdominal tenderness, not distended, positive BS  Genitourinary: deferred  Rectal: deferred  Musculoskeletal: no muscle tenderness, no joint swelling or tenderness  Extremities: no pedal edema  Neurological: AxOx3, moving all extremities, no focal deficits  Skin: no rashes          LABS:                          12.2   12.6  )-----------( 313      ( 08 Feb 2018 07:24 )             37.3       02-08    139  |  108  |  12  ----------------------------<  124<H>  3.8   |  25  |  0.62    Ca    8.5      08 Feb 2018 07:24    TPro  8.4<H>  /  Alb  3.3  /  TBili  0.3  /  DBili  x   /  AST  45<H>  /  ALT  65  /  AlkPhos  114  02-08          .Blood Blood-Peripheral  01-08 @ 08:42   No growth at 5 days.  --  --        RADIOLOGY:    < from: CT Neck Soft Tissue w/ IV Cont (02.07.18 @ 11:31) >    EXAM:  CT NECK SOFT TISSUE IC                            PROCEDURE DATE:  02/07/2018          INTERPRETATION:  .    CLINICAL INFORMATION: Neck pain and swelling. Possible left-sided abscess.    TECHNIQUE: Axial sections were obtained from the skullbase to the   sternum after the administration of intravenous contrast. 90 cc's of   Omnipaque 350 was administered and 10 cc's were discarded. Sagittal and   Coronal reformations were obtained from the source data.     COMPARISON: No prior CT examinations of the neck are available for   comparison.    FINDINGS: There is a low attenuation rim-enhancing collection within the   left peritonsillar region which measures 2.5 x 2.5 x 2.8 cm. (AP x TRV x   CC). There is surrounding inflammatory change and edema tracking along   the left hypopharynx and also superiorly into the left nasopharynx. The   airway is shifted to the right but remains patent. There are associated   enlarged left-sided cervical lymph nodes which are reactive.    The larynx and trachea are within normal limits.    The bilateral parotid and submandibular glands appear unremarkable.   Thyroid gland is within normal limits.    No acute osseous or vascular abnormalities are imaged. There is minimal   biapical pleural thickening and/or scarring.    Impression: Fairly large left-sided peritonsillar abscess with associated   cervical lymphadenopathy. Patent airway.

## 2018-02-08 NOTE — CONSULT NOTE ADULT - ASSESSMENT
47y female w/ h/o GERD, anemia is admitted for sore throat and fever 3 days PTA. As per family at bedside translating, the patient had the pain inside the mouth for a month PTA and resolved after visiting  ED. The pain returns and worsening 3 days PTA. Pt also reports fever over 100 at home a couple days PTA. Pt states it was too painful that she could not open her house and eat. Pt went to [facility] and was given Augmentin for the pain with no improvement. Pt denies chills, nausea, vomiting, SOB PTA.   ENT performed bedside procedure- I&D of abscess and aspirated ~2-3cc of pus for culture.     1. Left peritonsillar abscess. Cervical lymphadenopathy   -low grade fever resolved   -leukocytosis   -obtain BC x2  -Negative Strep Group A  -agree with Unasyn 3g IV [id number]; will switch if needed based on the abscess culture   -reason for abx use and side effects reviewed with patient; monitor BMP   -monitor BMP, vitals    -f/u CBC  -hydrate  -supportive care    2. Other issues:   -care per medicine 47y female w/ h/o GERD, anemia is admitted for sore throat and fever 3 days PTA. As per family at bedside translating, the patient had the pain inside the mouth for a month PTA and resolved after visiting  ED. The pain returns and worsening 3 days PTA. Pt also reports fever over 100 at home a couple days PTA. Pt states it was too painful that she could not open her house and eat. Pt went to [facility] and was given Augmentin for the pain with no improvement. Pt denies chills, nausea, vomiting, SOB PTA.   ENT performed bedside procedure- I&D of abscess and aspirated ~2-3cc of pus for culture.     1. Left peritonsillar abscess. Cervical lymphadenopathy   -low grade fever resolved   -leukocytosis   -obtain BC x2  -Negative Strep Group A Swab   -agree with Unasyn 3g IV [id number]; will switch if needed based on the abscess culture   -reason for abx use and side effects reviewed with patient; monitor BMP   -monitor BMP, vitals    -f/u CBC  -hydrate  -supportive care    2. Other issues:   -care per medicine 48 y/o female with h/o GERD, anemia was admitted on 2/7 fora sore throat and fever x 3 days PTA. The patient had pain inside the mouth for a month PTA, then resolved, then recurred 3 days PTA. Pt also reports fever over 100F at home a couple days PTA. Pt states it was too painful to open her mouth and had difficulty eating. Pt went to [facility] and was given Augmentin for the pain with no improvement. Pt denies chills, nausea, vomiting, SOB PTA. In ED she was given IV ancef, clindamycin and IV decadron.     1. Left peritonsillar abscess s/p I and D. Cervical lymphadenopathy   -dysphagia  -leukocytosis   -obtain BC x 2  -Negative Strep Group A Swab   -agree with Unasyn 3g IV q6 hours  -f/u tonsillar c/s   -reason for abx use and side effects reviewed with patient; monitor BMP   -advance diet as tolerated  -monitor BMP, vitals    -f/u CBC  -hydrate  -supportive care    2. Other issues:   -care per medicine

## 2018-02-09 ENCOUNTER — TRANSCRIPTION ENCOUNTER (OUTPATIENT)
Age: 48
End: 2018-02-09

## 2018-02-09 VITALS
OXYGEN SATURATION: 100 % | DIASTOLIC BLOOD PRESSURE: 62 MMHG | SYSTOLIC BLOOD PRESSURE: 120 MMHG | RESPIRATION RATE: 16 BRPM | TEMPERATURE: 97 F | HEART RATE: 70 BPM

## 2018-02-09 LAB
ANION GAP SERPL CALC-SCNC: 8 MMOL/L — SIGNIFICANT CHANGE UP (ref 5–17)
BUN SERPL-MCNC: 14 MG/DL — SIGNIFICANT CHANGE UP (ref 7–23)
CALCIUM SERPL-MCNC: 8.3 MG/DL — LOW (ref 8.5–10.1)
CHLORIDE SERPL-SCNC: 110 MMOL/L — HIGH (ref 96–108)
CO2 SERPL-SCNC: 24 MMOL/L — SIGNIFICANT CHANGE UP (ref 22–31)
CREAT SERPL-MCNC: 0.61 MG/DL — SIGNIFICANT CHANGE UP (ref 0.5–1.3)
CULTURE RESULTS: SIGNIFICANT CHANGE UP
GLUCOSE SERPL-MCNC: 109 MG/DL — HIGH (ref 70–99)
HCT VFR BLD CALC: 35.1 % — SIGNIFICANT CHANGE UP (ref 34.5–45)
HGB BLD-MCNC: 11.4 G/DL — LOW (ref 11.5–15.5)
MCHC RBC-ENTMCNC: 30 PG — SIGNIFICANT CHANGE UP (ref 27–34)
MCHC RBC-ENTMCNC: 32.6 GM/DL — SIGNIFICANT CHANGE UP (ref 32–36)
MCV RBC AUTO: 91.7 FL — SIGNIFICANT CHANGE UP (ref 80–100)
PLATELET # BLD AUTO: 334 K/UL — SIGNIFICANT CHANGE UP (ref 150–400)
POTASSIUM SERPL-MCNC: 3.8 MMOL/L — SIGNIFICANT CHANGE UP (ref 3.5–5.3)
POTASSIUM SERPL-SCNC: 3.8 MMOL/L — SIGNIFICANT CHANGE UP (ref 3.5–5.3)
RBC # BLD: 3.82 M/UL — SIGNIFICANT CHANGE UP (ref 3.8–5.2)
RBC # FLD: 17 % — HIGH (ref 10.3–14.5)
SODIUM SERPL-SCNC: 142 MMOL/L — SIGNIFICANT CHANGE UP (ref 135–145)
SPECIMEN SOURCE: SIGNIFICANT CHANGE UP
WBC # BLD: 15.8 K/UL — HIGH (ref 3.8–10.5)
WBC # FLD AUTO: 15.8 K/UL — HIGH (ref 3.8–10.5)

## 2018-02-09 RX ORDER — OXYCODONE HYDROCHLORIDE 5 MG/1
5 TABLET ORAL ONCE
Qty: 0 | Refills: 0 | Status: DISCONTINUED | OUTPATIENT
Start: 2018-02-09 | End: 2018-02-09

## 2018-02-09 RX ORDER — IBUPROFEN 200 MG
1 TABLET ORAL
Qty: 0 | Refills: 0 | COMMUNITY
Start: 2018-02-09

## 2018-02-09 RX ORDER — IBUPROFEN 200 MG
600 TABLET ORAL EVERY 6 HOURS
Qty: 0 | Refills: 0 | Status: DISCONTINUED | OUTPATIENT
Start: 2018-02-09 | End: 2018-02-09

## 2018-02-09 RX ADMIN — AMPICILLIN SODIUM AND SULBACTAM SODIUM 200 GRAM(S): 250; 125 INJECTION, POWDER, FOR SUSPENSION INTRAMUSCULAR; INTRAVENOUS at 01:56

## 2018-02-09 RX ADMIN — OXYCODONE HYDROCHLORIDE 5 MILLIGRAM(S): 5 TABLET ORAL at 06:23

## 2018-02-09 RX ADMIN — OXYCODONE HYDROCHLORIDE 5 MILLIGRAM(S): 5 TABLET ORAL at 01:20

## 2018-02-09 RX ADMIN — AMPICILLIN SODIUM AND SULBACTAM SODIUM 200 GRAM(S): 250; 125 INJECTION, POWDER, FOR SUSPENSION INTRAMUSCULAR; INTRAVENOUS at 06:02

## 2018-02-09 RX ADMIN — AMPICILLIN SODIUM AND SULBACTAM SODIUM 200 GRAM(S): 250; 125 INJECTION, POWDER, FOR SUSPENSION INTRAMUSCULAR; INTRAVENOUS at 12:45

## 2018-02-09 RX ADMIN — Medication 40 MILLIGRAM(S): at 06:02

## 2018-02-09 NOTE — DISCHARGE NOTE ADULT - CARE PROVIDER_API CALL
Vladimir Reinoso), Otolaryngology; Sleep Medicine  [address] [facility]  [address] [place] [address]  Phone: [phone]  Fax: [phone]    Co, Fabrice SUTHERLAND), Internal Medicine  [address] [place] [address]  Phone: [phone]  Fax: [phone]

## 2018-02-09 NOTE — DISCHARGE NOTE ADULT - NS AS DC PROVIDER CONTACT Y/N MULTI
----- Message from Seth Thao MD sent at 1/3/2018 10:34 AM CST -----  Start ciprofloxacin 2:15 AM twice daily for 5 days. Pending cultures. Yes

## 2018-02-09 NOTE — DISCHARGE NOTE ADULT - MEDICATION SUMMARY - MEDICATIONS TO TAKE
I will START or STAY ON the medications listed below when I get home from the hospital:    MethylPREDNISolone Dose Pack 4 mg oral tablet  -- 24mg D#1  20mg D#2  16mg D#3  12mg D#4  8mg D#5  4mg day #6   as directed    -- It is very important that you take or use this exactly as directed.  Do not skip doses or discontinue unless directed by your doctor.  Obtain medical advice before taking any non-prescription drugs as some may affect the action of this medication.  Take with food or milk.    -- Indication: For medrol dose pack- use as directed    ibuprofen 600 mg oral tablet  -- 1 tab(s) by mouth every 6 hours, As needed, pain  -- Indication: For Pain     ferrous sulfate 325 mg (65 mg elemental iron) oral tablet  -- 1 tab(s) by mouth 3 times a day  -- Indication: For supplement    Augmentin 875 mg-125 mg oral tablet  -- 1 tab(s) by mouth every 12 hours   -- Finish all this medication unless otherwise directed by prescriber.  Take with food or milk.    -- Indication: For TOnsillar abscess    omeprazole 20 mg oral delayed release capsule  -- 1 cap(s) by mouth once a day  -- Indication: For PPI

## 2018-02-09 NOTE — DISCHARGE NOTE ADULT - HOSPITAL COURSE
47y female w/ PMH GERD presents to ED w/ sore throat for past few days.  As per family at bedside translating, she had left side face pain for 1 month which worsened over past week. Admitted with pertosillar abscess, s/p I/D with ENT. Patient was then started on IV antibiotic and steroids. Patient tolerated procedure and did well. Plan is for discharge home with instructiosn to f/u with ENT as an outpatient.     2/9- tolerated diet this morning. Afebrile overnight- no new complaints.     Vital Signs Last 24 Hrs  T(C): 36.3 (09 Feb 2018 12:23), Max: 36.6 (08 Feb 2018 21:39)  T(F): 97.3 (09 Feb 2018 12:23), Max: 97.9 (08 Feb 2018 21:39)  HR: 70 (09 Feb 2018 12:23) (65 - 72)  BP: 120/62 (09 Feb 2018 12:23) (111/51 - 123/89)  BP(mean): --  RR: 16 (09 Feb 2018 12:23) (16 - 18)  SpO2: 100% (09 Feb 2018 12:23) (99% - 100%)    ROS:   All 10 systems reviewed and found to be negative with the exception of what has been described above.    PE:  Constitutional: NAD, laying in bed  HEENT: NC/AT, EOMI, PERRLA  Neck: supple  Back: no tenderness  Respiratory: LCTA  Cardiovascular: S1S2 regular, no murmurs  Abdomen: soft, not tender, not distended, positive BS  Genitourinary: voiding  Rectal: deferred  Musculoskeletal: no muscle tenderness, no joint swelling or tenderness  Extremities: no pedal edema   Neurological: no focal deficits      PLAN  *left peritonsillar abscess/edema/cervical lymphadenopathy  - s/p I&D of abscess and aspiration of ~2-3cc pus for culture  - ENT consult appreciated   - IV steroids for 1-2d for edema, day 3- continue with medrol dose pack as an outpatient  - continue IV unasyn - continue augmentin as an outpatient  - follow up abscess culture and blood cultures  - IVF x 1 liter, clear liquid diet  - ID consult appreciated  - will need to f/u with ENT Dr Del Castillo as an outpatient    *leukocytosis, low grade temp  - due to above  - not septic  - normal lactate     *mild elevation AST  doubt ebv but will check for completeness (also pt states son in ED few days ago w/ viral infection)  - EBV IGG+ no acute infection     *GERD  - ppi    Case d/w Dr Prater and team on IDR.

## 2018-02-09 NOTE — PROGRESS NOTE ADULT - ASSESSMENT
48 y/o female with h/o GERD, anemia was admitted on 2/7 fora sore throat and fever x 3 days PTA. The patient had pain inside the mouth for a month PTA, then resolved, then recurred 3 days PTA. Pt also reports fever over 100F at home a couple days PTA. Pt states it was too painful to open her mouth and had difficulty eating. Pt went to [facility] and was given Augmentin for the pain with no improvement. Pt denies chills, nausea, vomiting, SOB PTA. In ED she was given IV ancef, clindamycin and IV decadron.     1. Left peritonsillar abscess s/p I and D. Cervical lymphadenopathy   -dysphagia improving  -leukocytosis ?related to steroids  -BC x 2 are negative to date  -Negative Strep Group A Swab   -on Unasyn 3g IV q6 hours  -tolerating abx well so far; no side effects noted  -f/u tonsillar c/s   -may change abx to augmentin 875 mg PO q12h for 10 more days  -advance diet as tolerated  -monitor BMP, vitals    -f/u with ENT  -supportive care    2. Other issues:   -care per medicine

## 2018-02-09 NOTE — DISCHARGE NOTE ADULT - PLAN OF CARE
resolution complete antibiotic regimen as prescribed. Notify your PCP if your symptom has worsened or if you develop excessive diarrhea while on antibiotic.  notify your PCP for worsening fever, chills, shortness of breath or difficutly swallowing  - continue medrol dose pack- use as directed.   f/u with Dr Reinoso as an outpatient

## 2018-02-09 NOTE — DISCHARGE NOTE ADULT - CARE PLAN
Principal Discharge DX:	Peritonsillar abscess  Goal:	resolution  Assessment and plan of treatment:	complete antibiotic regimen as prescribed. Notify your PCP if your symptom has worsened or if you develop excessive diarrhea while on antibiotic.  notify your PCP for worsening fever, chills, shortness of breath or difficutly swallowing  - continue medrol dose pack- use as directed.   f/u with Dr Reinoso as an outpatient

## 2018-02-09 NOTE — DISCHARGE NOTE ADULT - PATIENT PORTAL LINK FT
You can access the [facility] Patient Portal, offered by [facility], by registering with the following website: [email or web address]

## 2018-02-11 LAB
CULTURE RESULTS: SIGNIFICANT CHANGE UP
SPECIMEN SOURCE: SIGNIFICANT CHANGE UP

## 2018-02-18 DIAGNOSIS — K21.9 GASTRO-ESOPHAGEAL REFLUX DISEASE WITHOUT ESOPHAGITIS: ICD-10-CM

## 2018-02-18 DIAGNOSIS — J36 PERITONSILLAR ABSCESS: ICD-10-CM

## 2018-02-18 DIAGNOSIS — R59.0 LOCALIZED ENLARGED LYMPH NODES: ICD-10-CM

## 2018-05-10 ENCOUNTER — RESULT REVIEW (OUTPATIENT)
Age: 48
End: 2018-05-10

## 2018-05-21 ENCOUNTER — OUTPATIENT (OUTPATIENT)
Dept: OUTPATIENT SERVICES | Facility: HOSPITAL | Age: 48
LOS: 1 days | End: 2018-05-21
Payer: COMMERCIAL

## 2018-05-21 ENCOUNTER — APPOINTMENT (OUTPATIENT)
Dept: MAMMOGRAPHY | Facility: CLINIC | Age: 48
End: 2018-05-21
Payer: COMMERCIAL

## 2018-05-21 DIAGNOSIS — Z00.8 ENCOUNTER FOR OTHER GENERAL EXAMINATION: ICD-10-CM

## 2018-05-21 PROCEDURE — 77063 BREAST TOMOSYNTHESIS BI: CPT | Mod: 26

## 2018-05-21 PROCEDURE — 77067 SCR MAMMO BI INCL CAD: CPT | Mod: 26

## 2018-05-21 PROCEDURE — 77063 BREAST TOMOSYNTHESIS BI: CPT

## 2018-05-21 PROCEDURE — 77067 SCR MAMMO BI INCL CAD: CPT

## 2018-08-31 PROBLEM — D64.9 ANEMIA, UNSPECIFIED: Chronic | Status: ACTIVE | Noted: 2018-01-08

## 2018-09-05 ENCOUNTER — OUTPATIENT (OUTPATIENT)
Dept: OUTPATIENT SERVICES | Facility: HOSPITAL | Age: 48
LOS: 1 days | End: 2018-09-05
Payer: COMMERCIAL

## 2018-09-05 ENCOUNTER — APPOINTMENT (OUTPATIENT)
Dept: ULTRASOUND IMAGING | Facility: CLINIC | Age: 48
End: 2018-09-05
Payer: SELF-PAY

## 2018-09-05 DIAGNOSIS — Z00.8 ENCOUNTER FOR OTHER GENERAL EXAMINATION: ICD-10-CM

## 2018-09-05 PROCEDURE — 76856 US EXAM PELVIC COMPLETE: CPT

## 2018-09-05 PROCEDURE — 76856 US EXAM PELVIC COMPLETE: CPT | Mod: 26

## 2018-09-05 PROCEDURE — 76830 TRANSVAGINAL US NON-OB: CPT | Mod: 26

## 2018-09-05 PROCEDURE — 76830 TRANSVAGINAL US NON-OB: CPT

## 2018-09-13 ENCOUNTER — RESULT REVIEW (OUTPATIENT)
Age: 48
End: 2018-09-13

## 2018-10-02 ENCOUNTER — APPOINTMENT (OUTPATIENT)
Dept: OBGYN | Facility: CLINIC | Age: 48
End: 2018-10-02
Payer: COMMERCIAL

## 2018-10-02 ENCOUNTER — OUTPATIENT (OUTPATIENT)
Dept: OUTPATIENT SERVICES | Facility: HOSPITAL | Age: 48
LOS: 1 days | End: 2018-10-02
Payer: SELF-PAY

## 2018-10-02 VITALS — WEIGHT: 201 LBS | DIASTOLIC BLOOD PRESSURE: 80 MMHG | SYSTOLIC BLOOD PRESSURE: 120 MMHG

## 2018-10-02 DIAGNOSIS — N76.0 ACUTE VAGINITIS: ICD-10-CM

## 2018-10-02 PROCEDURE — G0463: CPT

## 2018-10-02 PROCEDURE — 99203 OFFICE O/P NEW LOW 30 MIN: CPT | Mod: NC

## 2018-10-15 DIAGNOSIS — N39.3 STRESS INCONTINENCE (FEMALE) (MALE): ICD-10-CM

## 2018-11-01 ENCOUNTER — APPOINTMENT (OUTPATIENT)
Dept: ULTRASOUND IMAGING | Facility: CLINIC | Age: 48
End: 2018-11-01
Payer: COMMERCIAL

## 2018-11-01 ENCOUNTER — OUTPATIENT (OUTPATIENT)
Dept: OUTPATIENT SERVICES | Facility: HOSPITAL | Age: 48
LOS: 1 days | End: 2018-11-01
Payer: SELF-PAY

## 2018-11-01 DIAGNOSIS — Z00.8 ENCOUNTER FOR OTHER GENERAL EXAMINATION: ICD-10-CM

## 2018-11-01 PROCEDURE — 76830 TRANSVAGINAL US NON-OB: CPT | Mod: 26

## 2018-11-01 PROCEDURE — 76856 US EXAM PELVIC COMPLETE: CPT

## 2018-11-01 PROCEDURE — 76830 TRANSVAGINAL US NON-OB: CPT

## 2018-11-01 PROCEDURE — 76856 US EXAM PELVIC COMPLETE: CPT | Mod: 26

## 2018-12-10 ENCOUNTER — LABORATORY RESULT (OUTPATIENT)
Age: 48
End: 2018-12-10

## 2018-12-11 ENCOUNTER — OUTPATIENT (OUTPATIENT)
Dept: OUTPATIENT SERVICES | Facility: HOSPITAL | Age: 48
LOS: 1 days | End: 2018-12-11
Payer: SELF-PAY

## 2018-12-11 ENCOUNTER — APPOINTMENT (OUTPATIENT)
Dept: OBGYN | Facility: CLINIC | Age: 48
End: 2018-12-11
Payer: COMMERCIAL

## 2018-12-11 VITALS — WEIGHT: 198 LBS | SYSTOLIC BLOOD PRESSURE: 130 MMHG | DIASTOLIC BLOOD PRESSURE: 88 MMHG

## 2018-12-11 DIAGNOSIS — N39.3 STRESS INCONTINENCE (FEMALE) (MALE): ICD-10-CM

## 2018-12-11 DIAGNOSIS — N76.0 ACUTE VAGINITIS: ICD-10-CM

## 2018-12-11 DIAGNOSIS — Z00.00 ENCOUNTER FOR GENERAL ADULT MEDICAL EXAMINATION W/OUT ABNORMAL FINDINGS: ICD-10-CM

## 2018-12-11 PROCEDURE — 99213 OFFICE O/P EST LOW 20 MIN: CPT | Mod: NC

## 2018-12-11 PROCEDURE — G0463: CPT

## 2018-12-11 PROCEDURE — 87491 CHLMYD TRACH DNA AMP PROBE: CPT

## 2018-12-11 PROCEDURE — 87591 N.GONORRHOEAE DNA AMP PROB: CPT

## 2018-12-11 PROCEDURE — 81002 URINALYSIS NONAUTO W/O SCOPE: CPT | Mod: NC

## 2018-12-11 PROCEDURE — 81002 URINALYSIS NONAUTO W/O SCOPE: CPT

## 2018-12-12 LAB
C TRACH RRNA SPEC QL NAA+PROBE: SIGNIFICANT CHANGE UP
N GONORRHOEA RRNA SPEC QL NAA+PROBE: SIGNIFICANT CHANGE UP
SPECIMEN SOURCE: SIGNIFICANT CHANGE UP

## 2018-12-13 DIAGNOSIS — G89.18 OTHER ACUTE POSTPROCEDURAL PAIN: ICD-10-CM

## 2018-12-13 DIAGNOSIS — N94.6 DYSMENORRHEA, UNSPECIFIED: ICD-10-CM

## 2019-01-07 ENCOUNTER — INPATIENT (INPATIENT)
Facility: HOSPITAL | Age: 49
LOS: 3 days | Discharge: HOME | End: 2019-01-11
Attending: SURGERY | Admitting: SURGERY
Payer: MEDICAID

## 2019-01-07 VITALS
OXYGEN SATURATION: 99 % | TEMPERATURE: 98 F | DIASTOLIC BLOOD PRESSURE: 93 MMHG | SYSTOLIC BLOOD PRESSURE: 129 MMHG | HEART RATE: 78 BPM | RESPIRATION RATE: 17 BRPM | WEIGHT: 149.91 LBS

## 2019-01-07 LAB
ALBUMIN SERPL ELPH-MCNC: 4.1 G/DL — SIGNIFICANT CHANGE UP (ref 3.3–5)
ALP SERPL-CCNC: 59 U/L — SIGNIFICANT CHANGE UP (ref 40–120)
ALT FLD-CCNC: 27 U/L — SIGNIFICANT CHANGE UP (ref 12–78)
ANION GAP SERPL CALC-SCNC: 6 MMOL/L — SIGNIFICANT CHANGE UP (ref 5–17)
APPEARANCE UR: CLEAR — SIGNIFICANT CHANGE UP
APTT BLD: 32.2 SEC — SIGNIFICANT CHANGE UP (ref 27.5–36.3)
AST SERPL-CCNC: 20 U/L — SIGNIFICANT CHANGE UP (ref 15–37)
BACTERIA # UR AUTO: ABNORMAL
BASOPHILS # BLD AUTO: 0.07 K/UL — SIGNIFICANT CHANGE UP (ref 0–0.2)
BASOPHILS NFR BLD AUTO: 0.7 % — SIGNIFICANT CHANGE UP (ref 0–2)
BILIRUB SERPL-MCNC: 0.2 MG/DL — SIGNIFICANT CHANGE UP (ref 0.2–1.2)
BILIRUB UR-MCNC: NEGATIVE — SIGNIFICANT CHANGE UP
BLD GP AB SCN SERPL QL: SIGNIFICANT CHANGE UP
BUN SERPL-MCNC: 9 MG/DL — SIGNIFICANT CHANGE UP (ref 7–23)
CALCIUM SERPL-MCNC: 8.7 MG/DL — SIGNIFICANT CHANGE UP (ref 8.5–10.1)
CHLORIDE SERPL-SCNC: 105 MMOL/L — SIGNIFICANT CHANGE UP (ref 96–108)
CO2 SERPL-SCNC: 25 MMOL/L — SIGNIFICANT CHANGE UP (ref 22–31)
COLOR SPEC: YELLOW — SIGNIFICANT CHANGE UP
CREAT SERPL-MCNC: 0.79 MG/DL — SIGNIFICANT CHANGE UP (ref 0.5–1.3)
D DIMER BLD IA.RAPID-MCNC: 174 NG/ML DDU — SIGNIFICANT CHANGE UP
DIFF PNL FLD: ABNORMAL
EOSINOPHIL # BLD AUTO: 0.08 K/UL — SIGNIFICANT CHANGE UP (ref 0–0.5)
EOSINOPHIL NFR BLD AUTO: 0.9 % — SIGNIFICANT CHANGE UP (ref 0–6)
EPI CELLS # UR: SIGNIFICANT CHANGE UP
GLUCOSE SERPL-MCNC: 96 MG/DL — SIGNIFICANT CHANGE UP (ref 70–99)
GLUCOSE UR QL: NEGATIVE MG/DL — SIGNIFICANT CHANGE UP
HCG SERPL-ACNC: <1 MIU/ML — SIGNIFICANT CHANGE UP
HCT VFR BLD CALC: 43.8 % — SIGNIFICANT CHANGE UP (ref 34.5–45)
HGB BLD-MCNC: 14 G/DL — SIGNIFICANT CHANGE UP (ref 11.5–15.5)
IMM GRANULOCYTES NFR BLD AUTO: 0.2 % — SIGNIFICANT CHANGE UP (ref 0–1.5)
INR BLD: 1.05 RATIO — SIGNIFICANT CHANGE UP (ref 0.88–1.16)
KETONES UR-MCNC: NEGATIVE — SIGNIFICANT CHANGE UP
LACTATE SERPL-SCNC: 1.4 MMOL/L — SIGNIFICANT CHANGE UP (ref 0.7–2)
LEUKOCYTE ESTERASE UR-ACNC: NEGATIVE — SIGNIFICANT CHANGE UP
LIDOCAIN IGE QN: 219 U/L — SIGNIFICANT CHANGE UP (ref 73–393)
LYMPHOCYTES # BLD AUTO: 2.34 K/UL — SIGNIFICANT CHANGE UP (ref 1–3.3)
LYMPHOCYTES # BLD AUTO: 25 % — SIGNIFICANT CHANGE UP (ref 13–44)
MCHC RBC-ENTMCNC: 30 PG — SIGNIFICANT CHANGE UP (ref 27–34)
MCHC RBC-ENTMCNC: 32 GM/DL — SIGNIFICANT CHANGE UP (ref 32–36)
MCV RBC AUTO: 94 FL — SIGNIFICANT CHANGE UP (ref 80–100)
MONOCYTES # BLD AUTO: 0.55 K/UL — SIGNIFICANT CHANGE UP (ref 0–0.9)
MONOCYTES NFR BLD AUTO: 5.9 % — SIGNIFICANT CHANGE UP (ref 2–14)
NEUTROPHILS # BLD AUTO: 6.31 K/UL — SIGNIFICANT CHANGE UP (ref 1.8–7.4)
NEUTROPHILS NFR BLD AUTO: 67.3 % — SIGNIFICANT CHANGE UP (ref 43–77)
NITRITE UR-MCNC: NEGATIVE — SIGNIFICANT CHANGE UP
NRBC # BLD: 0 /100 WBCS — SIGNIFICANT CHANGE UP (ref 0–0)
PH UR: 6.5 — SIGNIFICANT CHANGE UP (ref 5–8)
PLATELET # BLD AUTO: 305 K/UL — SIGNIFICANT CHANGE UP (ref 150–400)
POTASSIUM SERPL-MCNC: 4.1 MMOL/L — SIGNIFICANT CHANGE UP (ref 3.5–5.3)
POTASSIUM SERPL-SCNC: 4.1 MMOL/L — SIGNIFICANT CHANGE UP (ref 3.5–5.3)
PROT SERPL-MCNC: 9 GM/DL — HIGH (ref 6–8.3)
PROT UR-MCNC: NEGATIVE MG/DL — SIGNIFICANT CHANGE UP
PROTHROM AB SERPL-ACNC: 11.7 SEC — SIGNIFICANT CHANGE UP (ref 10–12.9)
RBC # BLD: 4.66 M/UL — SIGNIFICANT CHANGE UP (ref 3.8–5.2)
RBC # FLD: 12.4 % — SIGNIFICANT CHANGE UP (ref 10.3–14.5)
RBC CASTS # UR COMP ASSIST: SIGNIFICANT CHANGE UP /HPF (ref 0–4)
SODIUM SERPL-SCNC: 136 MMOL/L — SIGNIFICANT CHANGE UP (ref 135–145)
SP GR SPEC: 1 — LOW (ref 1.01–1.02)
TROPONIN I SERPL-MCNC: <0.015 NG/ML — SIGNIFICANT CHANGE UP (ref 0.01–0.04)
TROPONIN I SERPL-MCNC: <0.015 NG/ML — SIGNIFICANT CHANGE UP (ref 0.01–0.04)
TYPE + AB SCN PNL BLD: SIGNIFICANT CHANGE UP
UROBILINOGEN FLD QL: NEGATIVE MG/DL — SIGNIFICANT CHANGE UP
WBC # BLD: 9.37 K/UL — SIGNIFICANT CHANGE UP (ref 3.8–10.5)
WBC # FLD AUTO: 9.37 K/UL — SIGNIFICANT CHANGE UP (ref 3.8–10.5)
WBC UR QL: SIGNIFICANT CHANGE UP

## 2019-01-07 PROCEDURE — 99285 EMERGENCY DEPT VISIT HI MDM: CPT

## 2019-01-07 PROCEDURE — 71045 X-RAY EXAM CHEST 1 VIEW: CPT | Mod: 26

## 2019-01-07 PROCEDURE — 93010 ELECTROCARDIOGRAM REPORT: CPT

## 2019-01-07 PROCEDURE — 76705 ECHO EXAM OF ABDOMEN: CPT | Mod: 26

## 2019-01-07 PROCEDURE — 71275 CT ANGIOGRAPHY CHEST: CPT | Mod: 26

## 2019-01-07 RX ORDER — HYDROMORPHONE HYDROCHLORIDE 2 MG/ML
0.5 INJECTION INTRAMUSCULAR; INTRAVENOUS; SUBCUTANEOUS EVERY 4 HOURS
Qty: 0 | Refills: 0 | Status: DISCONTINUED | OUTPATIENT
Start: 2019-01-07 | End: 2019-01-09

## 2019-01-07 RX ORDER — PIPERACILLIN AND TAZOBACTAM 4; .5 G/20ML; G/20ML
3.38 INJECTION, POWDER, LYOPHILIZED, FOR SOLUTION INTRAVENOUS EVERY 8 HOURS
Qty: 0 | Refills: 0 | Status: DISCONTINUED | OUTPATIENT
Start: 2019-01-07 | End: 2019-01-09

## 2019-01-07 RX ORDER — MORPHINE SULFATE 50 MG/1
4 CAPSULE, EXTENDED RELEASE ORAL ONCE
Qty: 0 | Refills: 0 | Status: DISCONTINUED | OUTPATIENT
Start: 2019-01-07 | End: 2019-01-07

## 2019-01-07 RX ORDER — PANTOPRAZOLE SODIUM 20 MG/1
40 TABLET, DELAYED RELEASE ORAL DAILY
Qty: 0 | Refills: 0 | Status: DISCONTINUED | OUTPATIENT
Start: 2019-01-07 | End: 2019-01-09

## 2019-01-07 RX ORDER — ONDANSETRON 8 MG/1
4 TABLET, FILM COATED ORAL ONCE
Qty: 0 | Refills: 0 | Status: COMPLETED | OUTPATIENT
Start: 2019-01-07 | End: 2019-01-07

## 2019-01-07 RX ORDER — INFLUENZA VIRUS VACCINE 15; 15; 15; 15 UG/.5ML; UG/.5ML; UG/.5ML; UG/.5ML
0.5 SUSPENSION INTRAMUSCULAR ONCE
Qty: 0 | Refills: 0 | Status: COMPLETED | OUTPATIENT
Start: 2019-01-07 | End: 2019-01-08

## 2019-01-07 RX ORDER — SODIUM CHLORIDE 9 MG/ML
2000 INJECTION INTRAMUSCULAR; INTRAVENOUS; SUBCUTANEOUS ONCE
Qty: 0 | Refills: 0 | Status: COMPLETED | OUTPATIENT
Start: 2019-01-07 | End: 2019-01-07

## 2019-01-07 RX ORDER — SODIUM CHLORIDE 9 MG/ML
1000 INJECTION INTRAMUSCULAR; INTRAVENOUS; SUBCUTANEOUS
Qty: 0 | Refills: 0 | Status: DISCONTINUED | OUTPATIENT
Start: 2019-01-07 | End: 2019-01-09

## 2019-01-07 RX ORDER — HEPARIN SODIUM 5000 [USP'U]/ML
5000 INJECTION INTRAVENOUS; SUBCUTANEOUS EVERY 8 HOURS
Qty: 0 | Refills: 0 | Status: DISCONTINUED | OUTPATIENT
Start: 2019-01-07 | End: 2019-01-09

## 2019-01-07 RX ORDER — SODIUM CHLORIDE 9 MG/ML
3 INJECTION INTRAMUSCULAR; INTRAVENOUS; SUBCUTANEOUS ONCE
Qty: 0 | Refills: 0 | Status: COMPLETED | OUTPATIENT
Start: 2019-01-07 | End: 2019-01-07

## 2019-01-07 RX ORDER — ONDANSETRON 8 MG/1
4 TABLET, FILM COATED ORAL EVERY 6 HOURS
Qty: 0 | Refills: 0 | Status: DISCONTINUED | OUTPATIENT
Start: 2019-01-07 | End: 2019-01-09

## 2019-01-07 RX ORDER — PIPERACILLIN AND TAZOBACTAM 4; .5 G/20ML; G/20ML
3.38 INJECTION, POWDER, LYOPHILIZED, FOR SOLUTION INTRAVENOUS ONCE
Qty: 0 | Refills: 0 | Status: COMPLETED | OUTPATIENT
Start: 2019-01-07 | End: 2019-01-07

## 2019-01-07 RX ORDER — HYDROMORPHONE HYDROCHLORIDE 2 MG/ML
1 INJECTION INTRAMUSCULAR; INTRAVENOUS; SUBCUTANEOUS EVERY 4 HOURS
Qty: 0 | Refills: 0 | Status: DISCONTINUED | OUTPATIENT
Start: 2019-01-07 | End: 2019-01-09

## 2019-01-07 RX ORDER — PROCHLORPERAZINE MALEATE 5 MG
10 TABLET ORAL ONCE
Qty: 0 | Refills: 0 | Status: COMPLETED | OUTPATIENT
Start: 2019-01-07 | End: 2019-01-07

## 2019-01-07 RX ADMIN — PIPERACILLIN AND TAZOBACTAM 25 GRAM(S): 4; .5 INJECTION, POWDER, LYOPHILIZED, FOR SOLUTION INTRAVENOUS at 22:32

## 2019-01-07 RX ADMIN — Medication 10 MILLIGRAM(S): at 23:28

## 2019-01-07 RX ADMIN — MORPHINE SULFATE 4 MILLIGRAM(S): 50 CAPSULE, EXTENDED RELEASE ORAL at 09:47

## 2019-01-07 RX ADMIN — ONDANSETRON 4 MILLIGRAM(S): 8 TABLET, FILM COATED ORAL at 21:35

## 2019-01-07 RX ADMIN — HYDROMORPHONE HYDROCHLORIDE 1 MILLIGRAM(S): 2 INJECTION INTRAMUSCULAR; INTRAVENOUS; SUBCUTANEOUS at 17:32

## 2019-01-07 RX ADMIN — PIPERACILLIN AND TAZOBACTAM 3.38 GRAM(S): 4; .5 INJECTION, POWDER, LYOPHILIZED, FOR SOLUTION INTRAVENOUS at 16:12

## 2019-01-07 RX ADMIN — PIPERACILLIN AND TAZOBACTAM 200 GRAM(S): 4; .5 INJECTION, POWDER, LYOPHILIZED, FOR SOLUTION INTRAVENOUS at 15:41

## 2019-01-07 RX ADMIN — PANTOPRAZOLE SODIUM 40 MILLIGRAM(S): 20 TABLET, DELAYED RELEASE ORAL at 17:32

## 2019-01-07 RX ADMIN — ONDANSETRON 4 MILLIGRAM(S): 8 TABLET, FILM COATED ORAL at 09:12

## 2019-01-07 RX ADMIN — SODIUM CHLORIDE 3 MILLILITER(S): 9 INJECTION INTRAMUSCULAR; INTRAVENOUS; SUBCUTANEOUS at 09:15

## 2019-01-07 RX ADMIN — MORPHINE SULFATE 4 MILLIGRAM(S): 50 CAPSULE, EXTENDED RELEASE ORAL at 09:17

## 2019-01-07 RX ADMIN — SODIUM CHLORIDE 125 MILLILITER(S): 9 INJECTION INTRAMUSCULAR; INTRAVENOUS; SUBCUTANEOUS at 20:01

## 2019-01-07 RX ADMIN — HEPARIN SODIUM 5000 UNIT(S): 5000 INJECTION INTRAVENOUS; SUBCUTANEOUS at 22:32

## 2019-01-07 RX ADMIN — SODIUM CHLORIDE 2000 MILLILITER(S): 9 INJECTION INTRAMUSCULAR; INTRAVENOUS; SUBCUTANEOUS at 09:20

## 2019-01-07 NOTE — PATIENT PROFILE ADULT - NSPROPTRIGHTBILLOFRIGHTS_GEN_A_NUR
Due for appt  Medication is being filled for 1 time refill only due to:  Patient needs to be seen because it has been more than one year since last visit.     patient

## 2019-01-07 NOTE — ED PROVIDER NOTE - NS_ ATTENDINGSCRIBEDETAILS _ED_A_ED_FT
The scribe's documentation has been prepared under my direction and personally reviewed by me in its entirety.  I confirm that the note above accurately reflects all my work, treatment, procedures, and decision making except where otherwise noted or amended by me.  Saeid Perez M.D.

## 2019-01-07 NOTE — PATIENT PROFILE ADULT - STATED REASON FOR ADMISSION
"Pain in the middle of my chest going under my right breast since last night. Nausea and vomiting too. With some blood in it"

## 2019-01-07 NOTE — H&P ADULT - ASSESSMENT
49 y/o F with Acute appendicitis  - NPO, IVF  -IV ABX  -Pain Control  -DVT/GI PPX  -F/I GI   -Surgery in AM  Plan discussed with Dr. Mendosa 47 y/o F with Acute choleystitis    - NPO, IVF  -IV ABX  -Pain Control  -DVT/GI PPX  -F/I GI   -Surgery in AM  Plan discussed with Dr. Mendosa

## 2019-01-07 NOTE — H&P ADULT - HISTORY OF PRESENT ILLNESS
49yo No Sig PMH p/w worsening SOB for the last 2 weeks w/ chest pain/epigastric pain and hematemesis and hemoptysis since last night.  Pt has had SOB for several month but recently began spitting up blood for the past week and now coughing up small amounts of blood when she gets the chest pain in the morning. Small amount  of quantity coughed up with each episode. Pt also recently seen OBGYN in [place] for vaginal bleeding and OBGYN ref to GI for above symptoms. Pt had appointment with GI on 1/17 at [facility]  for Cholangiography. Currently on no medication. LMP in November. No recent travel, no sick-contacts.  Denies HA, dizziness, N/V/D, fevers or chills. 49yo No Sig PMH p/w worsening of abdominal pain for the last 2 weeks w/ chest pain/epigastric pain and vomiting  since last night. Pt also recently seen OBGYN in [place] for vaginal bleeding and OBGYN ref to GI for above symptoms. Pt had appointment with GI on 1/17 at [facility] . Currently on no medication. LMP in November. No recent travel, no sick-contacts.  Denies HA, dizziness, N/V/D, fevers or chills.

## 2019-01-07 NOTE — H&P ADULT - NSHPREVIEWOFSYSTEMS_GEN_ALL_CORE
· CARDIOVASCULAR: - - -  · Cardiovascular [+]: CHEST PAIN  · Cardiovascular [-]: no palpitations, no peripheral edema, no syncope  · RESPIRATORY: - - -  · GASTROINTESTINAL: abdominal pain, no bloating, no constipation, no diarrhea, no nausea and no vomiting.  · ROS STATEMENT: all other ROS negative except as per HPI

## 2019-01-07 NOTE — H&P ADULT - NSHPPHYSICALEXAM_GEN_ALL_CORE
· CONSTITUTIONAL: Well appearing, well nourished, awake, alert, oriented to person, place, time/situation and in no apparent distress.  · ENMT: Airway patent, Nasal mucosa clear. Mouth with normal mucosa. Throat has no vesicles, no oropharyngeal exudates and uvula is midline.  · EYES: Clear bilaterally, pupils equal, round and reactive to light.  · CARDIAC: Normal rate, regular rhythm.  Heart sounds S1, S2.  No murmurs, rubs or gallops.  · RESPIRATORY: Breath sounds clear and equal bilaterally.  · GASTROINTESTINAL: Abdomen soft, non-tender, no guarding, +BS  · MUSCULOSKELETAL: Spine appears normal, range of motion is not limited, no muscle or joint tenderness  · NEUROLOGICAL: Alert and oriented, no focal deficits, no motor or sensory deficits.  · SKIN: Skin normal color for race, warm, dry and intact. No evidence of rash.

## 2019-01-07 NOTE — ED ADULT NURSE NOTE - NSFALLRSKASSESSDT_ED_ALL_ED
ACT Asthma Control Test not done at recent appt per weekly quality report.  Mailed to pt to fill out when feeling better.    MY CHART pending.  Maricarmen De Jesus MA    07-Jan-2019 09:51

## 2019-01-07 NOTE — ED PROVIDER NOTE - MEDICAL DECISION MAKING DETAILS
49yo F w/ PMHx of anemia (unknown etiology) presents to the ED with worsening SOB for the last 2 weeks w/ chest pain and hemopysis since last night.  Plan: UA, CXR, CT Chest, fluids, nausea medication

## 2019-01-07 NOTE — ED PROVIDER NOTE - CHPI ED SYMPTOMS POS
+mild hemopysis/SHORTNESS OF BREATH/COUGH/CHEST PAIN CHEST PAIN/COUGH/Trace hemopysis/SHORTNESS OF BREATH

## 2019-01-07 NOTE — ED ADULT NURSE REASSESSMENT NOTE - NS ED NURSE REASSESS COMMENT FT1
Received care of patient from Gerardo Montero RN. Patient resting comfortably, awaiting test results.

## 2019-01-07 NOTE — H&P ADULT - ATTENDING COMMENTS
Pt seen and examined. the history was taken which is consistent with biliary colic and now cholecystitis.  has evidence of gallstones and cholecystitis in sonogram and CT scan.  liver function test wnl, no CBD dilatation.    Plan: lap cholecystectomy in AM, intra op endoscopy  the translation line ( [employer] [id number]) was used to obtain informed consent. pt understood and agreed with the plan.

## 2019-01-07 NOTE — ED PROVIDER NOTE - OBJECTIVE STATEMENT
47yo F w/ PMHx of anemia (unknown etiology) presents to the ED with worsening SOB for the last 2 weeks w/ chest pain and hemopysis since last night.  Pt has had SOB for several month but recently began spitting up blood for the past week and now coughing up small amounts of blood when she gets the chest pain in the morning.  Unknown quantity coughed up with each episode.  She states she has an "infection of her uterus" w/o further explanation.  Currently on no medication.  Currently not pregnant.  No recent travel, no sick-contacts.  Denies HA, dizziness, N/V/D, fevers or chills.  Not on any home medications.  NKDA.  : [id number]

## 2019-01-07 NOTE — ED PROVIDER NOTE - ATTENDING CONTRIBUTION TO CARE
DONATO Perez MD,  performed the initial face to face bedside interview with this patient regarding history of present illness, review of symptoms and relevant past medical, social and family history.  I completed an independent physical examination.  I was the initial provider who evaluated this patient. I have signed out the follow up of any pending tests (i.e. labs, radiological studies) to the resident.  I have communicated the patient’s plan of care and disposition with the resident.

## 2019-01-08 LAB
ALBUMIN SERPL ELPH-MCNC: 3.1 G/DL — LOW (ref 3.3–5)
ALP SERPL-CCNC: 45 U/L — SIGNIFICANT CHANGE UP (ref 40–120)
ALT FLD-CCNC: 19 U/L — SIGNIFICANT CHANGE UP (ref 12–78)
ANION GAP SERPL CALC-SCNC: 7 MMOL/L — SIGNIFICANT CHANGE UP (ref 5–17)
AST SERPL-CCNC: 14 U/L — LOW (ref 15–37)
BASOPHILS # BLD AUTO: 0.06 K/UL — SIGNIFICANT CHANGE UP (ref 0–0.2)
BASOPHILS NFR BLD AUTO: 0.7 % — SIGNIFICANT CHANGE UP (ref 0–2)
BILIRUB DIRECT SERPL-MCNC: 0.1 MG/DL — SIGNIFICANT CHANGE UP (ref 0–0.2)
BILIRUB INDIRECT FLD-MCNC: 0.4 MG/DL — SIGNIFICANT CHANGE UP (ref 0.2–1)
BILIRUB SERPL-MCNC: 0.5 MG/DL — SIGNIFICANT CHANGE UP (ref 0.2–1.2)
BUN SERPL-MCNC: 10 MG/DL — SIGNIFICANT CHANGE UP (ref 7–23)
CALCIUM SERPL-MCNC: 7.7 MG/DL — LOW (ref 8.5–10.1)
CHLORIDE SERPL-SCNC: 110 MMOL/L — HIGH (ref 96–108)
CO2 SERPL-SCNC: 24 MMOL/L — SIGNIFICANT CHANGE UP (ref 22–31)
CREAT SERPL-MCNC: 0.72 MG/DL — SIGNIFICANT CHANGE UP (ref 0.5–1.3)
CULTURE RESULTS: SIGNIFICANT CHANGE UP
EOSINOPHIL # BLD AUTO: 0.06 K/UL — SIGNIFICANT CHANGE UP (ref 0–0.5)
EOSINOPHIL NFR BLD AUTO: 0.7 % — SIGNIFICANT CHANGE UP (ref 0–6)
GLUCOSE SERPL-MCNC: 87 MG/DL — SIGNIFICANT CHANGE UP (ref 70–99)
HCT VFR BLD CALC: 34.8 % — SIGNIFICANT CHANGE UP (ref 34.5–45)
HGB BLD-MCNC: 11.2 G/DL — LOW (ref 11.5–15.5)
IMM GRANULOCYTES NFR BLD AUTO: 0.5 % — SIGNIFICANT CHANGE UP (ref 0–1.5)
LYMPHOCYTES # BLD AUTO: 1.94 K/UL — SIGNIFICANT CHANGE UP (ref 1–3.3)
LYMPHOCYTES # BLD AUTO: 22.4 % — SIGNIFICANT CHANGE UP (ref 13–44)
MCHC RBC-ENTMCNC: 30.4 PG — SIGNIFICANT CHANGE UP (ref 27–34)
MCHC RBC-ENTMCNC: 32.2 GM/DL — SIGNIFICANT CHANGE UP (ref 32–36)
MCV RBC AUTO: 94.6 FL — SIGNIFICANT CHANGE UP (ref 80–100)
MONOCYTES # BLD AUTO: 0.46 K/UL — SIGNIFICANT CHANGE UP (ref 0–0.9)
MONOCYTES NFR BLD AUTO: 5.3 % — SIGNIFICANT CHANGE UP (ref 2–14)
NEUTROPHILS # BLD AUTO: 6.11 K/UL — SIGNIFICANT CHANGE UP (ref 1.8–7.4)
NEUTROPHILS NFR BLD AUTO: 70.4 % — SIGNIFICANT CHANGE UP (ref 43–77)
NRBC # BLD: 0 /100 WBCS — SIGNIFICANT CHANGE UP (ref 0–0)
PLATELET # BLD AUTO: 257 K/UL — SIGNIFICANT CHANGE UP (ref 150–400)
POTASSIUM SERPL-MCNC: 3.5 MMOL/L — SIGNIFICANT CHANGE UP (ref 3.5–5.3)
POTASSIUM SERPL-SCNC: 3.5 MMOL/L — SIGNIFICANT CHANGE UP (ref 3.5–5.3)
PROT SERPL-MCNC: 7 GM/DL — SIGNIFICANT CHANGE UP (ref 6–8.3)
RBC # BLD: 3.68 M/UL — LOW (ref 3.8–5.2)
RBC # FLD: 12.6 % — SIGNIFICANT CHANGE UP (ref 10.3–14.5)
SODIUM SERPL-SCNC: 141 MMOL/L — SIGNIFICANT CHANGE UP (ref 135–145)
SPECIMEN SOURCE: SIGNIFICANT CHANGE UP
WBC # BLD: 8.67 K/UL — SIGNIFICANT CHANGE UP (ref 3.8–10.5)
WBC # FLD AUTO: 8.67 K/UL — SIGNIFICANT CHANGE UP (ref 3.8–10.5)

## 2019-01-08 PROCEDURE — 74181 MRI ABDOMEN W/O CONTRAST: CPT | Mod: 26

## 2019-01-08 PROCEDURE — 78226 HEPATOBILIARY SYSTEM IMAGING: CPT | Mod: 26

## 2019-01-08 RX ADMIN — HEPARIN SODIUM 5000 UNIT(S): 5000 INJECTION INTRAVENOUS; SUBCUTANEOUS at 21:58

## 2019-01-08 RX ADMIN — PANTOPRAZOLE SODIUM 40 MILLIGRAM(S): 20 TABLET, DELAYED RELEASE ORAL at 10:24

## 2019-01-08 RX ADMIN — PIPERACILLIN AND TAZOBACTAM 25 GRAM(S): 4; .5 INJECTION, POWDER, LYOPHILIZED, FOR SOLUTION INTRAVENOUS at 06:11

## 2019-01-08 RX ADMIN — SODIUM CHLORIDE 125 MILLILITER(S): 9 INJECTION INTRAMUSCULAR; INTRAVENOUS; SUBCUTANEOUS at 21:57

## 2019-01-08 RX ADMIN — INFLUENZA VIRUS VACCINE 0.5 MILLILITER(S): 15; 15; 15; 15 SUSPENSION INTRAMUSCULAR at 06:11

## 2019-01-08 RX ADMIN — HEPARIN SODIUM 5000 UNIT(S): 5000 INJECTION INTRAVENOUS; SUBCUTANEOUS at 14:09

## 2019-01-08 RX ADMIN — HEPARIN SODIUM 5000 UNIT(S): 5000 INJECTION INTRAVENOUS; SUBCUTANEOUS at 06:11

## 2019-01-08 RX ADMIN — SODIUM CHLORIDE 125 MILLILITER(S): 9 INJECTION INTRAMUSCULAR; INTRAVENOUS; SUBCUTANEOUS at 06:10

## 2019-01-08 RX ADMIN — PIPERACILLIN AND TAZOBACTAM 25 GRAM(S): 4; .5 INJECTION, POWDER, LYOPHILIZED, FOR SOLUTION INTRAVENOUS at 14:08

## 2019-01-08 RX ADMIN — PIPERACILLIN AND TAZOBACTAM 25 GRAM(S): 4; .5 INJECTION, POWDER, LYOPHILIZED, FOR SOLUTION INTRAVENOUS at 21:58

## 2019-01-08 NOTE — CONSULT NOTE ADULT - SUBJECTIVE AND OBJECTIVE BOX
HPI:  49yo Woman with epig RUQ abd pain for one day  has had bouts of this abdominal pain intermittent in past few years  is a poor historian  pos for nausea and vomiting and after some vomiting a very small streak of brb noted and no further today  no fever or chills  no brbpr or melena  no wt loss  appetite poor today because of abd pain  no heartburn or dysphagia       PAST MEDICAL & SURGICAL HISTORY:  vaginal bleeding seen at [place]     No significant past surgical history      Allergies    No Known Allergies    Intolerances        MEDICATIONS  (STANDING):  heparin  Injectable 5000 Unit(s) SubCutaneous every 8 hours  pantoprazole  Injectable 40 milliGRAM(s) IV Push daily  piperacillin/tazobactam IVPB. 3.375 Gram(s) IV Intermittent every 8 hours  sodium chloride 0.9%. 1000 milliLiter(s) (125 mL/Hr) IV Continuous <Continuous>    MEDICATIONS  (PRN):  HYDROmorphone  Injectable 0.5 milliGRAM(s) IV Push every 4 hours PRN Moderate Pain (4 - 6)  HYDROmorphone  Injectable 1 milliGRAM(s) IV Push every 4 hours PRN Severe Pain (7 - 10)  ondansetron Injectable 4 milliGRAM(s) IV Push every 6 hours PRN Nausea      FAMILY HISTORY:  No pertinent family history in first degree relatives      Social History: reports no etoh and no tobacco     REVIEW OF SYSTEMS      General:	No fever or chills    Skin/Breast: No jaundice or rash   		  ENMT: denies sore throat or thrush    Respiratory and Thorax: Denies dyspnea or cough or shortness of breath  	  Cardiovascular: Denies chest pain or palpitations 	    Gastrointestinal: Denies jaundice or pruritis    Genitourinary: Denies dysuria or hematuria	    Musculoskeletal: Denies muscular pain or swelling	    Neurological: Denies confusion or tremor	    Hematology/Lymphatics: Denies easy bruising or bleeding 	    Endocrine:	Denies polyphagia or polyuria    See above hx otherwise neg for any major organ systems    PHYSICAL EXAM:    Vital Signs Last 24 Hrs  T(C): 36.9 (07 Jan 2019 23:54), Max: 36.9 (07 Jan 2019 23:54)  T(F): 98.4 (07 Jan 2019 23:54), Max: 98.4 (07 Jan 2019 23:54)  HR: 84 (07 Jan 2019 23:54) (64 - 84)  BP: 113/62 (07 Jan 2019 23:54) (97/50 - 131/54)  BP(mean): --  RR: 16 (07 Jan 2019 23:54) (16 - 18)  SpO2: 99% (07 Jan 2019 23:54) (99% - 100%)    Constitutional: no acute distress    ENMT: NC/AT scl anicteric opm pink no lesions     Neck: supple. No jvd or LN    Respiratory: Clear     Cardiovascular: RRR s1s2     Gastrointestinal: Pos bs , soft , mod tenderness RUQ, no hepatosplenomegaly,  no mass; no r/g      Back: No CVA tenderness    Extremities: NO cce     Neurological: Alert and oriented x 3     Skin: No rash or jaundice     Date/Time:01-08 @ 05:35    Albumin: 3.1  ALT/SGPT: 19  Alk Phos: 45  AST/SGOT: 14  Bilirubin Direct: 0.1  Bilirubin Total: 0.5  Ca: 7.7  eGFR : 115  eGFR Non-: 99  Lipase: --  Amylase: --  INR: --  PTT: --  Date/Time:01-07 @ 08:57    Albumin: 4.1  ALT/SGPT: 27  Alk Phos: 59  AST/SGOT: 20  Bilirubin Direct: --  Bilirubin Total: 0.2  Ca: 8.7  eGFR : 103  eGFR Non-: 89  Lipase: 219  Amylase: --  INR: 1.05  PTT: --      01-08    141  |  110<H>  |  10  ----------------------------<  87  3.5   |  24  |  0.72    Ca    7.7<L>      08 Jan 2019 05:35    TPro  7.0  /  Alb  3.1<L>  /  TBili  0.5  /  DBili  0.1  /  AST  14<L>  /  ALT  19  /  AlkPhos  45  01-08    Lipase, Serum: 219 U/L (01.07.19 @ 08:57)                            11.2   8.67  )-----------( 257      ( 08 Jan 2019 05:35 )             34.8   < from: US Abdomen Limited (01.07.19 @ 12:56) >    EXAM:  US ABDOMEN LIMITED                            PROCEDURE DATE:  01/07/2019          INTERPRETATION:  Exam Date: 1/7/2019 12:56 PM    Ultrasound of the right upper quadrant       CLINICAL INFORMATION:   Abdominal pain and vomiting    TECHNIQUE:   Transcutaneous ultrasonography limited to the right upper   quadrant of the abdomen was performed.    FINDINGS:   No previous examinations are available for review.    The liver demonstrates homogeneous echotexture without focal lesion.    Hepaticsize and contours are maintained.  Hepatic and portal veins   appear patent and are not displaced.  No intrahepatic or ductal   dilatation is found.  The common duct is not dilated, measuring 0.5 cm,   however calculi within the CBD cannot be excluded on this exam.    There are multiple calculi are present within the gallbladder with mild   gallbladder wall thickening measuring up to 4 mm, suspicious for acute   cholecystitis.  No tenderness was elicited with direct compression by the   transducer (no sonographic Hahn's sign identified).    The right kidney measures 11.5 cm in length. Several small simple cysts   in the right kidney. It demonstrates no mass, calculus or hydronephrosis.    The abdominal aorta and IVC appear intact.      IMPRESSION:      Multiple calculi are present within the gallbladder with mild gallbladder   wall thickening measuring up to 4 mm, suspicious for acute cholecystitis.    No tenderness was elicited with direct compression by the transducer (no   sonographic Hahn's sign identified).    The common duct is not dilated, measuring 0.5 cm, however calculi within   the CBD cannot be excluded on this exam.                  ESMER BURNS M.D., ATTENDING RADIOLOGIST  This document has been electronically signed. Jan 7 2019  2:31PM    < end of copied text >  < from: CT Abdomen and Pelvis w/ Oral Cont and w/ IV Cont (01.08.18 @ 11:07) >    EXAM:  CT ABDOMEN AND PELVIS OC IC                            PROCEDURE DATE:  01/08/2018          INTERPRETATION:  Clinical information: Left flank and lower quadrant   pain. Rule out pyelonephritis    COMPARISON: Noncontrast CT from October 20, 2016.    PROCEDURE:   CT of the Abdomen and Pelvis was performed with intravenous contrast.   Intravenous contrast: 90 ml Omnipaque 350. 10 ml discarded.  Oral contrast: Not administered.  Sagittal and coronal reformats were performed.    FINDINGS:    LOWER CHEST: Visualized lung bases, heart and pericardium are   unremarkable.    LIVER: Within normal limits.  SPLEEN: Within normal limits.  PANCREAS: Within normal limits.  GALLBLADDER: Within normal limits.  BILE DUCTS: Normal caliber.  ADRENALS: Within normal limits.  KIDNEYS/URETERS: Right lower pole low-density lesions which are too small   to characterize. No suspicious mass, stone or hydronephrosis. No abnormal   renal parenchymal or ureteral enhancement to suggest   ureteropyelitis/pyelonephritis.    RETROPERITONEUM: No lymphadenopathy.    VESSELS:  Within normal limits.    BOWEL: No bowel obstruction, wall thickening or inflammatory change.   Appendix is normal.  PERITONEUM: No ascites or pneumoperitoneum.    REPRODUCTIVE ORGANS: 2.5cm anterior uterine fibroid. The adnexa are   unremarkable.  BLADDER: Within normal limits.    ABDOMINAL WALL: Within normal limits.  BONES: No acute bony abnormality.    IMPRESSION:   No acute pathology. Specifically, there is no evidence of left-sided   pyelonephritis or ureteropyelitis.                    ANTONY BARCLAY   This document has been electronically signed. Jan 8 2018 11:29AM              < end of copied text >

## 2019-01-09 ENCOUNTER — RESULT REVIEW (OUTPATIENT)
Age: 49
End: 2019-01-09

## 2019-01-09 LAB — HCG UR QL: NEGATIVE — SIGNIFICANT CHANGE UP

## 2019-01-09 PROCEDURE — 88304 TISSUE EXAM BY PATHOLOGIST: CPT | Mod: 26

## 2019-01-09 RX ORDER — SODIUM CHLORIDE 9 MG/ML
1000 INJECTION INTRAMUSCULAR; INTRAVENOUS; SUBCUTANEOUS
Qty: 0 | Refills: 0 | Status: DISCONTINUED | OUTPATIENT
Start: 2019-01-09 | End: 2019-01-11

## 2019-01-09 RX ORDER — ACETAMINOPHEN 500 MG
650 TABLET ORAL EVERY 6 HOURS
Qty: 0 | Refills: 0 | Status: DISCONTINUED | OUTPATIENT
Start: 2019-01-09 | End: 2019-01-09

## 2019-01-09 RX ORDER — FENTANYL CITRATE 50 UG/ML
50 INJECTION INTRAVENOUS
Qty: 0 | Refills: 0 | Status: DISCONTINUED | OUTPATIENT
Start: 2019-01-09 | End: 2019-01-09

## 2019-01-09 RX ORDER — OXYCODONE AND ACETAMINOPHEN 5; 325 MG/1; MG/1
1 TABLET ORAL EVERY 4 HOURS
Qty: 0 | Refills: 0 | Status: DISCONTINUED | OUTPATIENT
Start: 2019-01-09 | End: 2019-01-11

## 2019-01-09 RX ORDER — HYDROMORPHONE HYDROCHLORIDE 2 MG/ML
0.5 INJECTION INTRAMUSCULAR; INTRAVENOUS; SUBCUTANEOUS
Qty: 0 | Refills: 0 | Status: DISCONTINUED | OUTPATIENT
Start: 2019-01-09 | End: 2019-01-09

## 2019-01-09 RX ORDER — PIPERACILLIN AND TAZOBACTAM 4; .5 G/20ML; G/20ML
3.38 INJECTION, POWDER, LYOPHILIZED, FOR SOLUTION INTRAVENOUS EVERY 8 HOURS
Qty: 0 | Refills: 0 | Status: DISCONTINUED | OUTPATIENT
Start: 2019-01-09 | End: 2019-01-11

## 2019-01-09 RX ORDER — ACETAMINOPHEN 500 MG
650 TABLET ORAL EVERY 6 HOURS
Qty: 0 | Refills: 0 | Status: DISCONTINUED | OUTPATIENT
Start: 2019-01-09 | End: 2019-01-11

## 2019-01-09 RX ORDER — ONDANSETRON 8 MG/1
4 TABLET, FILM COATED ORAL EVERY 6 HOURS
Qty: 0 | Refills: 0 | Status: DISCONTINUED | OUTPATIENT
Start: 2019-01-09 | End: 2019-01-11

## 2019-01-09 RX ORDER — ONDANSETRON 8 MG/1
4 TABLET, FILM COATED ORAL ONCE
Qty: 0 | Refills: 0 | Status: DISCONTINUED | OUTPATIENT
Start: 2019-01-09 | End: 2019-01-09

## 2019-01-09 RX ORDER — OXYCODONE HYDROCHLORIDE 5 MG/1
5 TABLET ORAL ONCE
Qty: 0 | Refills: 0 | Status: DISCONTINUED | OUTPATIENT
Start: 2019-01-09 | End: 2019-01-09

## 2019-01-09 RX ORDER — SODIUM CHLORIDE 9 MG/ML
1000 INJECTION, SOLUTION INTRAVENOUS
Qty: 0 | Refills: 0 | Status: DISCONTINUED | OUTPATIENT
Start: 2019-01-09 | End: 2019-01-09

## 2019-01-09 RX ORDER — OXYCODONE HYDROCHLORIDE 5 MG/1
10 TABLET ORAL ONCE
Qty: 0 | Refills: 0 | Status: DISCONTINUED | OUTPATIENT
Start: 2019-01-09 | End: 2019-01-09

## 2019-01-09 RX ORDER — MORPHINE SULFATE 50 MG/1
2 CAPSULE, EXTENDED RELEASE ORAL EVERY 4 HOURS
Qty: 0 | Refills: 0 | Status: DISCONTINUED | OUTPATIENT
Start: 2019-01-09 | End: 2019-01-11

## 2019-01-09 RX ADMIN — PIPERACILLIN AND TAZOBACTAM 25 GRAM(S): 4; .5 INJECTION, POWDER, LYOPHILIZED, FOR SOLUTION INTRAVENOUS at 21:54

## 2019-01-09 RX ADMIN — SODIUM CHLORIDE 125 MILLILITER(S): 9 INJECTION INTRAMUSCULAR; INTRAVENOUS; SUBCUTANEOUS at 05:59

## 2019-01-09 RX ADMIN — HYDROMORPHONE HYDROCHLORIDE 0.5 MILLIGRAM(S): 2 INJECTION INTRAMUSCULAR; INTRAVENOUS; SUBCUTANEOUS at 18:38

## 2019-01-09 RX ADMIN — HYDROMORPHONE HYDROCHLORIDE 0.5 MILLIGRAM(S): 2 INJECTION INTRAMUSCULAR; INTRAVENOUS; SUBCUTANEOUS at 18:48

## 2019-01-09 RX ADMIN — OXYCODONE HYDROCHLORIDE 10 MILLIGRAM(S): 5 TABLET ORAL at 19:21

## 2019-01-09 RX ADMIN — PIPERACILLIN AND TAZOBACTAM 25 GRAM(S): 4; .5 INJECTION, POWDER, LYOPHILIZED, FOR SOLUTION INTRAVENOUS at 05:59

## 2019-01-09 RX ADMIN — HYDROMORPHONE HYDROCHLORIDE 0.5 MILLIGRAM(S): 2 INJECTION INTRAMUSCULAR; INTRAVENOUS; SUBCUTANEOUS at 18:35

## 2019-01-09 RX ADMIN — HYDROMORPHONE HYDROCHLORIDE 0.5 MILLIGRAM(S): 2 INJECTION INTRAMUSCULAR; INTRAVENOUS; SUBCUTANEOUS at 18:46

## 2019-01-09 RX ADMIN — MORPHINE SULFATE 2 MILLIGRAM(S): 50 CAPSULE, EXTENDED RELEASE ORAL at 21:04

## 2019-01-09 RX ADMIN — MORPHINE SULFATE 2 MILLIGRAM(S): 50 CAPSULE, EXTENDED RELEASE ORAL at 20:54

## 2019-01-09 RX ADMIN — OXYCODONE HYDROCHLORIDE 10 MILLIGRAM(S): 5 TABLET ORAL at 19:15

## 2019-01-09 RX ADMIN — HYDROMORPHONE HYDROCHLORIDE 0.5 MILLIGRAM(S): 2 INJECTION INTRAMUSCULAR; INTRAVENOUS; SUBCUTANEOUS at 19:02

## 2019-01-09 RX ADMIN — Medication 650 MILLIGRAM(S): at 11:10

## 2019-01-09 RX ADMIN — HEPARIN SODIUM 5000 UNIT(S): 5000 INJECTION INTRAVENOUS; SUBCUTANEOUS at 05:59

## 2019-01-09 RX ADMIN — SODIUM CHLORIDE 110 MILLILITER(S): 9 INJECTION INTRAMUSCULAR; INTRAVENOUS; SUBCUTANEOUS at 20:54

## 2019-01-09 RX ADMIN — ONDANSETRON 4 MILLIGRAM(S): 8 TABLET, FILM COATED ORAL at 23:16

## 2019-01-09 RX ADMIN — PANTOPRAZOLE SODIUM 40 MILLIGRAM(S): 20 TABLET, DELAYED RELEASE ORAL at 12:43

## 2019-01-09 RX ADMIN — Medication 650 MILLIGRAM(S): at 11:45

## 2019-01-09 NOTE — BRIEF OPERATIVE NOTE - PROCEDURE
<<-----Click on this checkbox to enter Procedure Cholecystectomy, laparoscopic  01/09/2019    Active  JORGE

## 2019-01-09 NOTE — PROGRESS NOTE ADULT - ATTENDING COMMENTS
pt seen and examined at the bedside. all the labs and radiologic studies was reviewed. plan was discussed with the patient.
pt seen and examined. agree with physical exam and plan.  will proceed with lap cholecystectomy today.

## 2019-01-10 LAB
ANION GAP SERPL CALC-SCNC: 13 MMOL/L — SIGNIFICANT CHANGE UP (ref 5–17)
BILIRUB DIRECT SERPL-MCNC: 0.2 MG/DL — SIGNIFICANT CHANGE UP (ref 0–0.2)
BILIRUB INDIRECT FLD-MCNC: 0.3 MG/DL — SIGNIFICANT CHANGE UP (ref 0.2–1)
BILIRUB SERPL-MCNC: 0.5 MG/DL — SIGNIFICANT CHANGE UP (ref 0.2–1.2)
BUN SERPL-MCNC: 5 MG/DL — LOW (ref 7–23)
CALCIUM SERPL-MCNC: 7.7 MG/DL — LOW (ref 8.5–10.1)
CHLORIDE SERPL-SCNC: 104 MMOL/L — SIGNIFICANT CHANGE UP (ref 96–108)
CO2 SERPL-SCNC: 20 MMOL/L — LOW (ref 22–31)
CREAT SERPL-MCNC: 0.72 MG/DL — SIGNIFICANT CHANGE UP (ref 0.5–1.3)
GLUCOSE SERPL-MCNC: 139 MG/DL — HIGH (ref 70–99)
HCT VFR BLD CALC: 37.8 % — SIGNIFICANT CHANGE UP (ref 34.5–45)
HGB BLD-MCNC: 12.4 G/DL — SIGNIFICANT CHANGE UP (ref 11.5–15.5)
MCHC RBC-ENTMCNC: 30.2 PG — SIGNIFICANT CHANGE UP (ref 27–34)
MCHC RBC-ENTMCNC: 32.8 GM/DL — SIGNIFICANT CHANGE UP (ref 32–36)
MCV RBC AUTO: 92.2 FL — SIGNIFICANT CHANGE UP (ref 80–100)
NRBC # BLD: 0 /100 WBCS — SIGNIFICANT CHANGE UP (ref 0–0)
PLATELET # BLD AUTO: 278 K/UL — SIGNIFICANT CHANGE UP (ref 150–400)
POTASSIUM SERPL-MCNC: 3.3 MMOL/L — LOW (ref 3.5–5.3)
POTASSIUM SERPL-SCNC: 3.3 MMOL/L — LOW (ref 3.5–5.3)
RBC # BLD: 4.1 M/UL — SIGNIFICANT CHANGE UP (ref 3.8–5.2)
RBC # FLD: 12.3 % — SIGNIFICANT CHANGE UP (ref 10.3–14.5)
SODIUM SERPL-SCNC: 137 MMOL/L — SIGNIFICANT CHANGE UP (ref 135–145)
WBC # BLD: 11.86 K/UL — HIGH (ref 3.8–10.5)
WBC # FLD AUTO: 11.86 K/UL — HIGH (ref 3.8–10.5)

## 2019-01-10 RX ORDER — HEPARIN SODIUM 5000 [USP'U]/ML
5000 INJECTION INTRAVENOUS; SUBCUTANEOUS EVERY 8 HOURS
Qty: 0 | Refills: 0 | Status: DISCONTINUED | OUTPATIENT
Start: 2019-01-10 | End: 2019-01-11

## 2019-01-10 RX ADMIN — PIPERACILLIN AND TAZOBACTAM 25 GRAM(S): 4; .5 INJECTION, POWDER, LYOPHILIZED, FOR SOLUTION INTRAVENOUS at 22:11

## 2019-01-10 RX ADMIN — ONDANSETRON 4 MILLIGRAM(S): 8 TABLET, FILM COATED ORAL at 05:36

## 2019-01-10 RX ADMIN — MORPHINE SULFATE 2 MILLIGRAM(S): 50 CAPSULE, EXTENDED RELEASE ORAL at 02:02

## 2019-01-10 RX ADMIN — SODIUM CHLORIDE 110 MILLILITER(S): 9 INJECTION INTRAMUSCULAR; INTRAVENOUS; SUBCUTANEOUS at 05:36

## 2019-01-10 RX ADMIN — HEPARIN SODIUM 5000 UNIT(S): 5000 INJECTION INTRAVENOUS; SUBCUTANEOUS at 22:11

## 2019-01-10 RX ADMIN — HEPARIN SODIUM 5000 UNIT(S): 5000 INJECTION INTRAVENOUS; SUBCUTANEOUS at 14:26

## 2019-01-10 RX ADMIN — OXYCODONE AND ACETAMINOPHEN 1 TABLET(S): 5; 325 TABLET ORAL at 12:52

## 2019-01-10 RX ADMIN — OXYCODONE AND ACETAMINOPHEN 1 TABLET(S): 5; 325 TABLET ORAL at 17:53

## 2019-01-10 RX ADMIN — PIPERACILLIN AND TAZOBACTAM 25 GRAM(S): 4; .5 INJECTION, POWDER, LYOPHILIZED, FOR SOLUTION INTRAVENOUS at 05:35

## 2019-01-10 RX ADMIN — OXYCODONE AND ACETAMINOPHEN 1 TABLET(S): 5; 325 TABLET ORAL at 22:47

## 2019-01-10 RX ADMIN — OXYCODONE AND ACETAMINOPHEN 1 TABLET(S): 5; 325 TABLET ORAL at 09:15

## 2019-01-10 RX ADMIN — HEPARIN SODIUM 5000 UNIT(S): 5000 INJECTION INTRAVENOUS; SUBCUTANEOUS at 06:53

## 2019-01-10 RX ADMIN — PIPERACILLIN AND TAZOBACTAM 25 GRAM(S): 4; .5 INJECTION, POWDER, LYOPHILIZED, FOR SOLUTION INTRAVENOUS at 14:26

## 2019-01-10 RX ADMIN — OXYCODONE AND ACETAMINOPHEN 1 TABLET(S): 5; 325 TABLET ORAL at 13:45

## 2019-01-10 RX ADMIN — OXYCODONE AND ACETAMINOPHEN 1 TABLET(S): 5; 325 TABLET ORAL at 08:36

## 2019-01-10 RX ADMIN — MORPHINE SULFATE 2 MILLIGRAM(S): 50 CAPSULE, EXTENDED RELEASE ORAL at 02:15

## 2019-01-10 RX ADMIN — OXYCODONE AND ACETAMINOPHEN 1 TABLET(S): 5; 325 TABLET ORAL at 18:53

## 2019-01-10 RX ADMIN — OXYCODONE AND ACETAMINOPHEN 1 TABLET(S): 5; 325 TABLET ORAL at 22:17

## 2019-01-10 NOTE — PROGRESS NOTE ADULT - ASSESSMENT
RUQ abd pain with mri findings suggestive of acute cholecystitis    abx  surgery     for hx anemia, pt advised to see her primary gastroenterologist for endo eval and colonoscopy   she says she has a gi doctor in [place] that she has appointment with that she will follow up with after discharge.
49 yo female with acute cholecystitis    Plan:  -Plan for Laparoscopic cholecystectomy today  -NPO  -Continue abx  -Pain control PRN  -DVT ppx    Plan discussed with surgical attending Dr. Mendosa
49 yo female with acute cholecystitis s/p laparoscopic cholecystectomy     Plan:  -monitor vitals  -pain control   -nausea control   -CLD  -Continue abx  -DVT ppx    Plan discussed with surgical attending Dr. Mendosa
pt with cholecystitis/ biliary colic, for lap sami today  awaiting MRCP rule out common bile duct stone as per Gi

## 2019-01-10 NOTE — PROGRESS NOTE ADULT - SUBJECTIVE AND OBJECTIVE BOX
pt with ruq abd pain  no n/v            Allergies    No Known Allergies    Intolerances        MEDICATIONS  (STANDING):  heparin  Injectable 5000 Unit(s) SubCutaneous every 8 hours  pantoprazole  Injectable 40 milliGRAM(s) IV Push daily  piperacillin/tazobactam IVPB. 3.375 Gram(s) IV Intermittent every 8 hours  sodium chloride 0.9%. 1000 milliLiter(s) (125 mL/Hr) IV Continuous <Continuous>    MEDICATIONS  (PRN):  HYDROmorphone  Injectable 0.5 milliGRAM(s) IV Push every 4 hours PRN Moderate Pain (4 - 6)  HYDROmorphone  Injectable 1 milliGRAM(s) IV Push every 4 hours PRN Severe Pain (7 - 10)  ondansetron Injectable 4 milliGRAM(s) IV Push every 6 hours PRN Nausea      REVIEW OF SYSTEMS      General:	No fever or chills    Skin/Breast: No jaundice or rash   		  ENMT: denies sore throat or thrush    Respiratory and Thorax: Denies dyspnea or cough or shortness of breath  	  Cardiovascular: Denies chest pain or palpitations 	    Gastrointestinal: Denies jaundice or pruritis    Genitourinary: Denies dysuria or hematuria	    Musculoskeletal: Denies muscular pain or swelling	    Neurological: Denies confusion or tremor	    Hematology/Lymphatics: Denies easy bruising or bleeding 	    Endocrine:	Denies polyphagia or polyuria    See above hx otherwise neg for any major organ systems    PHYSICAL EXAM:    Vital Signs Last 24 Hrs  T(C): 36.8 (08 Jan 2019 23:30), Max: 36.8 (08 Jan 2019 23:30)  T(F): 98.2 (08 Jan 2019 23:30), Max: 98.2 (08 Jan 2019 23:30)  HR: 72 (08 Jan 2019 23:30) (66 - 72)  BP: 112/70 (08 Jan 2019 23:30) (106/48 - 112/70)  BP(mean): --  RR: 16 (08 Jan 2019 23:30) (16 - 16)  SpO2: 99% (08 Jan 2019 23:30) (99% - 99%)    Constitutional: no acute distress    ENMT: NC/AT scl anicteric opm pink no lesions     Neck: supple. No jvd or LN    Respiratory: Clear     Cardiovascular: RRR s1s2     Gastrointestinal: Pos bs , soft , mild tenderness RUQ without rebound, no hepatosplenomegaly,  no mass      Back: No CVA tenderness    Extremities: NO cce     Neurological: Alert and oriented x 3     Skin: No rash or jaundice    Date/Time:01-08 @ 05:35    ALT/SGPT: 19  Albumin: 3.1  AST/SGOT: 14  Bilirubin Direct: 0.1  Bilirubin Total: 0.5  Ca: 7.7  eGFR : 115  eGFR Non-: 99  Lipase: --  Amylase: --  INR: --  PTT: --        Date/Time:01-07 @ 08:57    ALT/SGPT: 27  Albumin: 4.1  AST/SGOT: 20  Bilirubin Direct: --  Bilirubin Total: 0.2  Ca: 8.7  eGFR : 103  eGFR Non-: 89  Lipase: 219  Amylase: --  INR: 1.05  PTT: --            01-08    141  |  110<H>  |  10  ----------------------------<  87  3.5   |  24  |  0.72    Ca    7.7<L>      08 Jan 2019 05:35    TPro  7.0  /  Alb  3.1<L>  /  TBili  0.5  /  DBili  0.1  /  AST  14<L>  /  ALT  19  /  AlkPhos  45  01-08                            11.2   8.67  )-----------( 257      ( 08 Jan 2019 05:35 )             34.8     < from: MR MRCP No Cont (01.08.19 @ 18:06) >    EXAM:  MR MRCP                            PROCEDURE DATE:  01/08/2019          INTERPRETATION:  Exam Date: 1/8/2019 6:06 PM  Clinical Information: Right upper quadrant.  Technique: MRI of the abdomen without contrast with MRCP with comparison   to ultrasound 1/7/2019, CT chest 1/7/2019    Findings:    Multiple gallstones. Minimal gallbladder wall thickening and small prior   cholecystectomy.. No intrahepatic or extra hepatic biliary dilatation.   Common bile ducts of normal caliber without evidence of common bile duct   calculi. Pancreas is of normal signal intensity without focal mass.   Pancreatic duct is of normal caliber.    Spleen and bilateral adrenal glands are unremarkable. The unenhanced   liver is demonstrates mild hepatic steatosis. No focal hepatic masses..    No hydronephrosis. Right lower pole renal cyst. No perinephric   collections. No significant retroperitoneal abnormality.    No aortic aneurysm. Degenerative changes of the visualized osseous   structures.    Impression:    Gallstones with minimal gallbladder wall thickening and small   pericholecystic fluid, MRI findings suggesting acute cholecystitis.    No intrahepatic or extra hepatic biliary dilatation. No common bile duct   calculi.                TAO ROBERTO M.D., ATTENDING RADIOLOGIST  This document has been electronically signed. Jan 8 2019  6:54PM                < end of copied text >  < from: NM Hepatobiliary Imaging (01.08.19 @ 10:00) >    EXAM:  NM HEPATOBILIARY IMG                            PROCEDURE DATE:  01/08/2019          INTERPRETATION:    RADIOPHARMACEUTICAL:  99mTc-Mebrofenin  DOSE: 3.3 mCi IV    CLINICAL INFORMATION: 48 year old female with right upper quadrant   abdominal pain and cholelithiasis, referred to evaluate for acute   cholecystitis    TECHNIQUE: Dynamic images of the anterior abdomen were obtained for   approximately 30 minutes immediately following radiotracer injection.   Static images of the abdomen in the anterior, right anterior oblique and   right lateral projections were also obtained.    COMPARISON: No previous hepatobiliary scan for comparison    FINDINGS: There is prompt uptake of the injected radiotracer by the   hepatocytes. The gallbladderis first visualized at 10 minutes post   tracer injection and bowel activity by 15 minutes. There is normal tracer   clearance from the liver by the end of the study.     IMPRESSION: Normal hepatobiliary scan.     No scan evidence of acute cholecystitis.                     YOANA SMALL   This document has been electronically signed. Jan 8 2019 10:13AM          < end of copied text >
Patient seen and examined at bedside. For OR today for Lap cholecystectomy. No overnight events.     Vital Signs Last 24 Hrs  T(C): 36.8 (08 Jan 2019 23:30), Max: 36.8 (08 Jan 2019 23:30)  T(F): 98.2 (08 Jan 2019 23:30), Max: 98.2 (08 Jan 2019 23:30)  HR: 72 (08 Jan 2019 23:30) (66 - 72)  BP: 112/70 (08 Jan 2019 23:30) (106/48 - 112/70)  BP(mean): --  RR: 16 (08 Jan 2019 23:30) (16 - 16)  SpO2: 99% (08 Jan 2019 23:30) (99% - 99%)                          11.2   8.67  )-----------( 257      ( 08 Jan 2019 05:35 )             34.8     01-08    141  |  110<H>  |  10  ----------------------------<  87  3.5   |  24  |  0.72    Ca    7.7<L>      08 Jan 2019 05:35    TPro  7.0  /  Alb  3.1<L>  /  TBili  0.5  /  DBili  0.1  /  AST  14<L>  /  ALT  19  /  AlkPhos  45  01-08
Subjective:   Patient seen and examined at bedside. patient reports nausea and vomiting overnight controlled with pain medication. No overnight events.     Vital Signs Last 24 Hrs  Vital Signs Last 24 Hrs  T(C): 37.2 (10 Sergey 2019 03:48), Max: 37.2 (10 Sergey 2019 03:48)  T(F): 99 (10 Sergey 2019 03:48), Max: 99 (10 Sergey 2019 03:48)  HR: 98 (10 Sergey 2019 03:48) (65 - 98)  BP: 150/84 (10 Sergey 2019 03:48) (99/47 - 154/77)  BP(mean): --  RR: 16 (10 Sergey 2019 03:48) (16 - 22)  SpO2: 97% (10 Sergey 2019 03:48) (96% - 100%)    Gen: NAD, cooperative   HEENT: supple, no JVD, EOMI  Lungs: CTA b/l, aerating well   CV: S1 and S2 present  Abd: BS present, TTP incisional, ND, no RGR   Ext: FROM, pulse present, no cyanosis, no edema   Skin: warm, dry                12.4   11.86 )-----------( 278      ( 10 Sergey 2019 06:29 )             37.8   01-10    137  |  104  |  5<L>  ----------------------------<  139<H>  3.3<L>   |  20<L>  |  0.72    Ca    7.7<L>      10 Sergey 2019 06:29    TPro  x   /  Alb  x   /  TBili  0.5  /  DBili  0.2  /  AST  x   /  ALT  x   /  AlkPhos  x   01-10
pt still have nausea, feeling better than last night, had one episode of bilious vomiting.  still have pain in right upper abdomen and epigastric area  mild right upper quadrant tenderness

## 2019-01-11 ENCOUNTER — TRANSCRIPTION ENCOUNTER (OUTPATIENT)
Age: 49
End: 2019-01-11

## 2019-01-11 VITALS
DIASTOLIC BLOOD PRESSURE: 63 MMHG | RESPIRATION RATE: 18 BRPM | TEMPERATURE: 98 F | OXYGEN SATURATION: 100 % | SYSTOLIC BLOOD PRESSURE: 109 MMHG | HEART RATE: 85 BPM

## 2019-01-11 RX ADMIN — PIPERACILLIN AND TAZOBACTAM 25 GRAM(S): 4; .5 INJECTION, POWDER, LYOPHILIZED, FOR SOLUTION INTRAVENOUS at 06:12

## 2019-01-11 RX ADMIN — HEPARIN SODIUM 5000 UNIT(S): 5000 INJECTION INTRAVENOUS; SUBCUTANEOUS at 06:10

## 2019-01-11 RX ADMIN — OXYCODONE AND ACETAMINOPHEN 1 TABLET(S): 5; 325 TABLET ORAL at 06:11

## 2019-01-11 RX ADMIN — OXYCODONE AND ACETAMINOPHEN 1 TABLET(S): 5; 325 TABLET ORAL at 06:32

## 2019-01-11 NOTE — DISCHARGE NOTE ADULT - PATIENT PORTAL LINK FT
You can access the [facility] Patient Portal, offered by [facility], by registering with the following website: [email or web address]

## 2019-01-11 NOTE — DISCHARGE NOTE ADULT - INSTRUCTIONS
Please removed outer dressing and leave steristrips in placed on POD2. Please wash wounds with soap and water daily. Please do not participate in any heavy lift for 4 weeks. Please retunr to hospital if there are any acute changes. Low fat diet

## 2019-01-11 NOTE — DISCHARGE NOTE ADULT - HOSPITAL COURSE
48 y.o F came to ED found to have acute cholecystitis. Patient was taken for laprascopic cholecystectomy and tolerated procedure well. Patient was kept for pain control and antibiotics treatment then stable for DC home.

## 2019-01-11 NOTE — DISCHARGE NOTE ADULT - MEDICATION SUMMARY - MEDICATIONS TO STOP TAKING
I will STOP taking the medications listed below when I get home from the hospital:  None I will STOP taking the medications listed below when I get home from the hospital:    Augmentin 875 mg-125 mg oral tablet  -- 1 tab(s) by mouth every 12 hours   -- Finish all this medication unless otherwise directed by prescriber.  Take with food or milk.    MethylPREDNISolone Dose Pack 4 mg oral tablet  -- 24mg D#1  20mg D#2  16mg D#3  12mg D#4  8mg D#5  4mg day #6   as directed    -- It is very important that you take or use this exactly as directed.  Do not skip doses or discontinue unless directed by your doctor.  Obtain medical advice before taking any non-prescription drugs as some may affect the action of this medication.  Take with food or milk.

## 2019-01-11 NOTE — DISCHARGE NOTE ADULT - CARE PLAN
Goal:	acute choelcystitis  Assessment and plan of treatment:	low fat diet for one month after discharge Principal Discharge DX:	Cholecystitis  Goal:	acute choelcystitis  Assessment and plan of treatment:	low fat diet for one month after discharge

## 2019-01-11 NOTE — DISCHARGE NOTE ADULT - MEDICATION SUMMARY - MEDICATIONS TO TAKE
I will START or STAY ON the medications listed below when I get home from the hospital:    MethylPREDNISolone Dose Pack 4 mg oral tablet  -- 24mg D#1  20mg D#2  16mg D#3  12mg D#4  8mg D#5  4mg day #6   as directed    -- It is very important that you take or use this exactly as directed.  Do not skip doses or discontinue unless directed by your doctor.  Obtain medical advice before taking any non-prescription drugs as some may affect the action of this medication.  Take with food or milk.    -- Indication: For Cholecystitis    oxycodone-acetaminophen 5 mg-325 mg oral tablet  -- 1 tab(s) by mouth every 6 hours, As Needed MDD:6 tablets   -- Caution federal law prohibits the transfer of this drug to any person other  than the person for whom it was prescribed.  May cause drowsiness.  Alcohol may intensify this effect.  Use care when operating dangerous machinery.  This prescription cannot be refilled.  This product contains acetaminophen.  Do not use  with any other product containing acetaminophen to prevent possible liver damage.  Using more of this medication than prescribed may cause serious breathing problems.    -- Indication: For Cholecystitis    ibuprofen 600 mg oral tablet  -- 1 tab(s) by mouth every 6 hours, As needed, pain  -- Indication: For Cholecystitis    ferrous sulfate 325 mg (65 mg elemental iron) oral tablet  -- 1 tab(s) by mouth 3 times a day  -- Indication: For Cholecystitis    Augmentin 875 mg-125 mg oral tablet  -- 1 tab(s) by mouth every 12 hours   -- Finish all this medication unless otherwise directed by prescriber.  Take with food or milk.    -- Indication: For Cholecystitis    omeprazole 20 mg oral delayed release capsule  -- 1 cap(s) by mouth once a day  -- Indication: For Cholecystitis I will START or STAY ON the medications listed below when I get home from the hospital:    ibuprofen 600 mg oral tablet  -- 1 tab(s) by mouth every 6 hours, As needed, pain  -- Indication: For Cholecystitis    oxycodone-acetaminophen 5 mg-325 mg oral tablet  -- 1 tab(s) by mouth every 6 hours, As Needed MDD:6 tablets   -- Caution federal law prohibits the transfer of this drug to any person other  than the person for whom it was prescribed.  May cause drowsiness.  Alcohol may intensify this effect.  Use care when operating dangerous machinery.  This prescription cannot be refilled.  This product contains acetaminophen.  Do not use  with any other product containing acetaminophen to prevent possible liver damage.  Using more of this medication than prescribed may cause serious breathing problems.    -- Indication: For Cholecystitis    ferrous sulfate 325 mg (65 mg elemental iron) oral tablet  -- 1 tab(s) by mouth 3 times a day  -- Indication: For Cholecystitis    omeprazole 20 mg oral delayed release capsule  -- 1 cap(s) by mouth once a day  -- Indication: For Cholecystitis

## 2019-01-11 NOTE — DISCHARGE NOTE ADULT - CARE PROVIDER_API CALL
Jacob Mendosa), Surgery  [address]  [address]  [place] [address]  Phone: [phone]  Fax: [phone]

## 2019-01-14 DIAGNOSIS — R10.9 UNSPECIFIED ABDOMINAL PAIN: ICD-10-CM

## 2019-01-14 DIAGNOSIS — R04.2 HEMOPTYSIS: ICD-10-CM

## 2019-01-14 DIAGNOSIS — Z79.2 LONG TERM (CURRENT) USE OF ANTIBIOTICS: ICD-10-CM

## 2019-01-14 DIAGNOSIS — K80.00 CALCULUS OF GALLBLADDER WITH ACUTE CHOLECYSTITIS WITHOUT OBSTRUCTION: ICD-10-CM

## 2019-01-14 DIAGNOSIS — D64.9 ANEMIA, UNSPECIFIED: ICD-10-CM

## 2019-01-14 DIAGNOSIS — Z79.52 LONG TERM (CURRENT) USE OF SYSTEMIC STEROIDS: ICD-10-CM

## 2019-01-14 LAB — SURGICAL PATHOLOGY FINAL REPORT - CH: SIGNIFICANT CHANGE UP

## 2019-01-17 ENCOUNTER — OUTPATIENT (OUTPATIENT)
Dept: OUTPATIENT SERVICES | Facility: HOSPITAL | Age: 49
LOS: 1 days | End: 2019-01-17
Payer: SELF-PAY

## 2019-01-17 ENCOUNTER — APPOINTMENT (OUTPATIENT)
Dept: OBGYN | Facility: CLINIC | Age: 49
End: 2019-01-17
Payer: COMMERCIAL

## 2019-01-17 VITALS
BODY MASS INDEX: 34.31 KG/M2 | HEIGHT: 64 IN | WEIGHT: 201 LBS | DIASTOLIC BLOOD PRESSURE: 80 MMHG | SYSTOLIC BLOOD PRESSURE: 124 MMHG

## 2019-01-17 DIAGNOSIS — G89.18 OTHER ACUTE POSTPROCEDURAL PAIN: ICD-10-CM

## 2019-01-17 DIAGNOSIS — N76.0 ACUTE VAGINITIS: ICD-10-CM

## 2019-01-17 PROCEDURE — 99213 OFFICE O/P EST LOW 20 MIN: CPT | Mod: GE

## 2019-01-17 PROCEDURE — G0463: CPT

## 2019-01-22 NOTE — PHYSICAL EXAM
[Normal] : uterus [Labia Majora] : labia major [Labia Minora] : labia minora [No Bleeding] : there was no active vaginal bleeding [Uterine Adnexae] : were not tender and not enlarged [Labia Majora Erythema] : no erythema of the labia majora [Labia Minora Erythema] : no erythema of the labia minora

## 2019-01-29 ENCOUNTER — APPOINTMENT (OUTPATIENT)
Dept: GASTROENTEROLOGY | Facility: HOSPITAL | Age: 49
End: 2019-01-29
Payer: COMMERCIAL

## 2019-01-29 ENCOUNTER — OUTPATIENT (OUTPATIENT)
Dept: OUTPATIENT SERVICES | Facility: HOSPITAL | Age: 49
LOS: 1 days | End: 2019-01-29
Payer: SELF-PAY

## 2019-01-29 VITALS
RESPIRATION RATE: 14 BRPM | WEIGHT: 200 LBS | SYSTOLIC BLOOD PRESSURE: 148 MMHG | DIASTOLIC BLOOD PRESSURE: 80 MMHG | HEIGHT: 64 IN | BODY MASS INDEX: 34.15 KG/M2 | HEART RATE: 75 BPM

## 2019-01-29 DIAGNOSIS — K31.9 DISEASE OF STOMACH AND DUODENUM, UNSPECIFIED: ICD-10-CM

## 2019-01-29 DIAGNOSIS — N94.6 DYSMENORRHEA, UNSPECIFIED: ICD-10-CM

## 2019-01-29 DIAGNOSIS — K59.09 OTHER CONSTIPATION: ICD-10-CM

## 2019-01-29 PROCEDURE — 99203 OFFICE O/P NEW LOW 30 MIN: CPT | Mod: GC

## 2019-01-29 PROCEDURE — G0463: CPT

## 2019-01-29 RX ORDER — POLYETHYLENE GLYCOL 3350 17 G/17G
17 POWDER, FOR SOLUTION ORAL DAILY
Qty: 1 | Refills: 0 | Status: ACTIVE | COMMUNITY
Start: 2019-01-29 | End: 1900-01-01

## 2019-01-29 RX ORDER — HYOSCYAMINE SULFATE 0.12 MG/1
0.12 TABLET ORAL
Qty: 120 | Refills: 2 | Status: ACTIVE | COMMUNITY
Start: 2019-01-29 | End: 1900-01-01

## 2019-01-29 NOTE — PHYSICAL EXAM
[General Appearance - Alert] : alert [General Appearance - In No Acute Distress] : in no acute distress [Sclera] : the sclera and conjunctiva were normal [PERRL With Normal Accommodation] : pupils were equal in size, round, and reactive to light [Extraocular Movements] : extraocular movements were intact [Outer Ear] : the ears and nose were normal in appearance [Oropharynx] : the oropharynx was normal [Neck Appearance] : the appearance of the neck was normal [Neck Cervical Mass (___cm)] : no neck mass was observed [Jugular Venous Distention Increased] : there was no jugular-venous distention [Thyroid Diffuse Enlargement] : the thyroid was not enlarged [Thyroid Nodule] : there were no palpable thyroid nodules [] : no respiratory distress [Auscultation Breath Sounds / Voice Sounds] : lungs were clear to auscultation bilaterally [Heart Rate And Rhythm] : heart rate was normal and rhythm regular [Heart Sounds] : normal S1 and S2 [Heart Sounds Gallop] : no gallops [Murmurs] : no murmurs [Heart Sounds Pericardial Friction Rub] : no pericardial rub [No CVA Tenderness] : no ~M costovertebral angle tenderness [No Spinal Tenderness] : no spinal tenderness [Abnormal Walk] : normal gait [Nail Clubbing] : no clubbing  or cyanosis of the fingernails [Musculoskeletal - Swelling] : no joint swelling seen [Motor Tone] : muscle strength and tone were normal [Deep Tendon Reflexes (DTR)] : deep tendon reflexes were 2+ and symmetric [Sensation] : the sensory exam was normal to light touch and pinprick [No Focal Deficits] : no focal deficits [Oriented To Time, Place, And Person] : oriented to person, place, and time [Impaired Insight] : insight and judgment were intact [Affect] : the affect was normal

## 2019-01-30 NOTE — ASSESSMENT
[FreeTextEntry1] : IMPRESSION\par - LLQ abdominal pain likely 2/2 chronic constipation\par - Hematochezia, likely 2/2 hemorrhoids, need to rule out colon cancer or large colon polyps \par - Obesity: BMI 34\par \par RECOMMENDATION\par \par - recommend miralax BID\par - recommend to ensure having atleast 1 BM per day by increasing water intake and fibre intake in diet \par - will plan for a colonoscopy\par - 2 day prep: magnesium citrate on day 1 and golytley with dulcolax on day 2\par - return to GI clinic in 2 weeks post procedure

## 2019-01-30 NOTE — HISTORY OF PRESENT ILLNESS
[de-identified] : 48 year old female with history of acute cholecystitis s/p laparoscopic cholecystectomy (1/9/2019) here in GI clinic to establish care for LLQ abdominal pain. \par \par Patient states that she has had LLQ abdominal pain for 2 years, on and off, non radiating, sometimes severe 10/10, improves slightly with Tylenol and after a BM. She usually moves her bowels once every 4-5 days, has to strain very hard to have a bowel movement, and sometimes see red blood when she wipes. \par She denies nausea, vomiting, loss of weight, loss of appetite, odynophagia or dysphagia. \par \par She has never had an endoscopy or a colonoscopy. She also denies family history of colon cancer. \par \par Workup: 1/2019\par MRCP: Gallstones with minimal gallbladder wall thickening and small  pericholecystic fluid, MRI findings suggesting acute cholecystitis. No intrahepatic or extra hepatic biliary dilatation. No common bile duct  calculi.\par \par HIDA Scan: Normal hepatobiliary scan. No scan evidence of acute cholecystitis. \par \par US abdomen: Multiple calculi are present within the gallbladder with mild gallbladder wall thickening measuring up to 4 mm, suspicious for acute cholecystitis.  No tenderness was elicited with direct compression by the transducer (no sonographic Hahn's sign identified). The common duct is not dilated, measuring 0.5 cm, however calculi within the CBD cannot be excluded on this exam.

## 2019-01-31 DIAGNOSIS — K59.09 OTHER CONSTIPATION: ICD-10-CM

## 2019-01-31 DIAGNOSIS — K92.1 MELENA: ICD-10-CM

## 2019-01-31 DIAGNOSIS — E66.9 OBESITY, UNSPECIFIED: ICD-10-CM

## 2019-01-31 DIAGNOSIS — R10.32 LEFT LOWER QUADRANT PAIN: ICD-10-CM

## 2019-02-06 ENCOUNTER — OUTPATIENT (OUTPATIENT)
Dept: OUTPATIENT SERVICES | Facility: HOSPITAL | Age: 49
LOS: 1 days | End: 2019-02-06
Payer: SELF-PAY

## 2019-02-06 ENCOUNTER — RESULT REVIEW (OUTPATIENT)
Age: 49
End: 2019-02-06

## 2019-02-06 DIAGNOSIS — K92.1 MELENA: ICD-10-CM

## 2019-02-06 DIAGNOSIS — K59.09 OTHER CONSTIPATION: ICD-10-CM

## 2019-02-06 PROCEDURE — 88305 TISSUE EXAM BY PATHOLOGIST: CPT

## 2019-02-06 PROCEDURE — 45380 COLONOSCOPY AND BIOPSY: CPT

## 2019-02-06 PROCEDURE — 88305 TISSUE EXAM BY PATHOLOGIST: CPT | Mod: 26

## 2019-02-07 LAB — SURGICAL PATHOLOGY STUDY: SIGNIFICANT CHANGE UP

## 2019-02-14 ENCOUNTER — OUTPATIENT (OUTPATIENT)
Dept: OUTPATIENT SERVICES | Facility: HOSPITAL | Age: 49
LOS: 1 days | End: 2019-02-14
Payer: SELF-PAY

## 2019-02-14 ENCOUNTER — APPOINTMENT (OUTPATIENT)
Dept: OBGYN | Facility: CLINIC | Age: 49
End: 2019-02-14
Payer: COMMERCIAL

## 2019-02-14 VITALS
WEIGHT: 203.25 LBS | DIASTOLIC BLOOD PRESSURE: 90 MMHG | SYSTOLIC BLOOD PRESSURE: 140 MMHG | BODY MASS INDEX: 34.89 KG/M2

## 2019-02-14 DIAGNOSIS — R10.32 OTHER ACUTE POSTPROCEDURAL PAIN: ICD-10-CM

## 2019-02-14 DIAGNOSIS — G89.18 OTHER ACUTE POSTPROCEDURAL PAIN: ICD-10-CM

## 2019-02-14 DIAGNOSIS — N76.0 ACUTE VAGINITIS: ICD-10-CM

## 2019-02-14 PROCEDURE — G0463: CPT

## 2019-02-14 PROCEDURE — 99213 OFFICE O/P EST LOW 20 MIN: CPT | Mod: GC

## 2019-02-15 DIAGNOSIS — K92.1 MELENA: ICD-10-CM

## 2019-02-15 DIAGNOSIS — K59.09 OTHER CONSTIPATION: ICD-10-CM

## 2019-02-15 DIAGNOSIS — R10.32 LEFT LOWER QUADRANT PAIN: ICD-10-CM

## 2019-02-21 DIAGNOSIS — G89.18 OTHER ACUTE POSTPROCEDURAL PAIN: ICD-10-CM

## 2019-03-05 ENCOUNTER — OUTPATIENT (OUTPATIENT)
Dept: OUTPATIENT SERVICES | Facility: HOSPITAL | Age: 49
LOS: 1 days | End: 2019-03-05
Payer: SELF-PAY

## 2019-03-05 ENCOUNTER — APPOINTMENT (OUTPATIENT)
Dept: GASTROENTEROLOGY | Facility: HOSPITAL | Age: 49
End: 2019-03-05
Payer: COMMERCIAL

## 2019-03-05 VITALS
HEIGHT: 64 IN | HEART RATE: 80 BPM | SYSTOLIC BLOOD PRESSURE: 131 MMHG | BODY MASS INDEX: 34.66 KG/M2 | RESPIRATION RATE: 16 BRPM | DIASTOLIC BLOOD PRESSURE: 87 MMHG | WEIGHT: 203 LBS

## 2019-03-05 DIAGNOSIS — K92.1 MELENA: ICD-10-CM

## 2019-03-05 DIAGNOSIS — K58.9 IRRITABLE BOWEL SYNDROME W/OUT DIARRHEA: ICD-10-CM

## 2019-03-05 DIAGNOSIS — K59.09 OTHER CONSTIPATION: ICD-10-CM

## 2019-03-05 DIAGNOSIS — K31.9 DISEASE OF STOMACH AND DUODENUM, UNSPECIFIED: ICD-10-CM

## 2019-03-05 DIAGNOSIS — K52.9 NONINFECTIVE GASTROENTERITIS AND COLITIS, UNSPECIFIED: ICD-10-CM

## 2019-03-05 DIAGNOSIS — E66.9 OBESITY, UNSPECIFIED: ICD-10-CM

## 2019-03-05 DIAGNOSIS — R10.32 LEFT LOWER QUADRANT PAIN: ICD-10-CM

## 2019-03-05 DIAGNOSIS — G89.18 OTHER ACUTE POSTPROCEDURAL PAIN: ICD-10-CM

## 2019-03-05 PROCEDURE — 99213 OFFICE O/P EST LOW 20 MIN: CPT | Mod: GC

## 2019-03-05 RX ORDER — POLYETHYLENE GLYCOL 3350, SODIUM SULFATE ANHYDROUS, SODIUM BICARBONATE, SODIUM CHLORIDE, POTASSIUM CHLORIDE 227.1; 21.5; 6.36; 5.53; .754 G/L; G/L; G/L; G/L; G/L
227.1 POWDER, FOR SOLUTION ORAL
Qty: 1 | Refills: 0 | Status: DISCONTINUED | COMMUNITY
Start: 2019-01-29 | End: 2019-03-05

## 2019-03-05 NOTE — HISTORY OF PRESENT ILLNESS
[de-identified] : This is a 48 year old female presenting for follow up of LLQ discomfort. She has sharp LLQ intermittent pain that improves following a bowel movement. Levsin PRN helps with this. She also has dyschezia and occasional blood per rectum. \par She is taking Miralax but her BM is only every other day.\par She saw GYN recently as well.\par She underwent a colonoscopy about 1 month ago and an area of mucosal congestion in the sigmoid was biopsied. This demonstrated focal cryptitis.

## 2019-03-05 NOTE — END OF VISIT
[] : Fellow [FreeTextEntry3] : As modified and discussed with patient\par MD JACKIE Chan [facility]\par Associate Professor of Medicine\par [facility] [employer]\par

## 2019-03-05 NOTE — ASSESSMENT
[FreeTextEntry1] : Impression:\par \par 1. IBS-C: the patient is still not optimized in terms of her bowel movements and pain.\par \par 2. Abnormal biopsy of the colon demonstrating colitis. No signs of chronicity demonstrated, so unlikely to be IBD. Possibly prep related or infectious. No symptoms of colitis at this time (she is constipated)\par \par 3. Obesity\par \par 4. Dyschezia and hematochezia(from hemorrhoids, seen on colonoscopy)\par \par Recommendation;\par -increase Miralax to BID, add Metamucil BID to help  with weight loss and stool frequency/dyschezia\par -Sitz Bath\par -fecal calprotectin to monitor for inflammation

## 2019-03-05 NOTE — PHYSICAL EXAM
[Extraocular Movements] : extraocular movements were intact [Jugular Venous Distention Increased] : there was no jugular-venous distention [Auscultation Breath Sounds / Voice Sounds] : lungs were clear to auscultation bilaterally [Heart Sounds] : normal S1 and S2 [Edema] : there was no peripheral edema [Abdomen Soft] : soft [Abdomen Tenderness] : non-tender [] : no rash [Oriented To Time, Place, And Person] : oriented to person, place, and time

## 2019-03-06 DIAGNOSIS — E66.9 OBESITY, UNSPECIFIED: ICD-10-CM

## 2019-03-06 DIAGNOSIS — K52.9 NONINFECTIVE GASTROENTERITIS AND COLITIS, UNSPECIFIED: ICD-10-CM

## 2019-03-06 DIAGNOSIS — K58.9 IRRITABLE BOWEL SYNDROME WITHOUT DIARRHEA: ICD-10-CM

## 2019-03-06 PROCEDURE — G0463: CPT

## 2019-03-06 PROCEDURE — 83993 ASSAY FOR CALPROTECTIN FECAL: CPT

## 2019-03-10 LAB — CALPROTECTIN STL-MCNT: 28 UG/G — SIGNIFICANT CHANGE UP (ref 0–120)

## 2019-04-07 ENCOUNTER — EMERGENCY (EMERGENCY)
Facility: HOSPITAL | Age: 49
LOS: 0 days | Discharge: HOME | End: 2019-04-07
Attending: EMERGENCY MEDICINE | Admitting: EMERGENCY MEDICINE
Payer: MEDICAID

## 2019-04-07 VITALS
TEMPERATURE: 99 F | SYSTOLIC BLOOD PRESSURE: 125 MMHG | OXYGEN SATURATION: 100 % | HEART RATE: 98 BPM | RESPIRATION RATE: 17 BRPM | DIASTOLIC BLOOD PRESSURE: 86 MMHG

## 2019-04-07 VITALS — HEIGHT: 65 IN | WEIGHT: 199.96 LBS

## 2019-04-07 DIAGNOSIS — N93.9 ABNORMAL UTERINE AND VAGINAL BLEEDING, UNSPECIFIED: ICD-10-CM

## 2019-04-07 DIAGNOSIS — N93.8 OTHER SPECIFIED ABNORMAL UTERINE AND VAGINAL BLEEDING: ICD-10-CM

## 2019-04-07 DIAGNOSIS — D64.9 ANEMIA, UNSPECIFIED: ICD-10-CM

## 2019-04-07 LAB
ALBUMIN SERPL ELPH-MCNC: 3.6 G/DL — SIGNIFICANT CHANGE UP (ref 3.3–5)
ALP SERPL-CCNC: 61 U/L — SIGNIFICANT CHANGE UP (ref 40–120)
ALT FLD-CCNC: 49 U/L — SIGNIFICANT CHANGE UP (ref 12–78)
ANION GAP SERPL CALC-SCNC: 6 MMOL/L — SIGNIFICANT CHANGE UP (ref 5–17)
APPEARANCE UR: ABNORMAL
APTT BLD: 27.1 SEC — LOW (ref 27.5–36.3)
AST SERPL-CCNC: 43 U/L — HIGH (ref 15–37)
BACTERIA # UR AUTO: ABNORMAL
BASOPHILS # BLD AUTO: 0.05 K/UL — SIGNIFICANT CHANGE UP (ref 0–0.2)
BASOPHILS NFR BLD AUTO: 0.7 % — SIGNIFICANT CHANGE UP (ref 0–2)
BILIRUB SERPL-MCNC: 0.2 MG/DL — SIGNIFICANT CHANGE UP (ref 0.2–1.2)
BILIRUB UR-MCNC: NEGATIVE — SIGNIFICANT CHANGE UP
BLD GP AB SCN SERPL QL: SIGNIFICANT CHANGE UP
BUN SERPL-MCNC: 12 MG/DL — SIGNIFICANT CHANGE UP (ref 7–23)
CALCIUM SERPL-MCNC: 8.1 MG/DL — LOW (ref 8.5–10.1)
CHLORIDE SERPL-SCNC: 108 MMOL/L — SIGNIFICANT CHANGE UP (ref 96–108)
CO2 SERPL-SCNC: 28 MMOL/L — SIGNIFICANT CHANGE UP (ref 22–31)
COLOR SPEC: ABNORMAL
CREAT SERPL-MCNC: 0.83 MG/DL — SIGNIFICANT CHANGE UP (ref 0.5–1.3)
DIFF PNL FLD: ABNORMAL
EOSINOPHIL # BLD AUTO: 0.12 K/UL — SIGNIFICANT CHANGE UP (ref 0–0.5)
EOSINOPHIL NFR BLD AUTO: 1.7 % — SIGNIFICANT CHANGE UP (ref 0–6)
EPI CELLS # UR: SIGNIFICANT CHANGE UP
GLUCOSE SERPL-MCNC: 90 MG/DL — SIGNIFICANT CHANGE UP (ref 70–99)
GLUCOSE UR QL: NEGATIVE MG/DL — SIGNIFICANT CHANGE UP
HCT VFR BLD CALC: 27.6 % — LOW (ref 34.5–45)
HGB BLD-MCNC: 8.9 G/DL — LOW (ref 11.5–15.5)
IMM GRANULOCYTES NFR BLD AUTO: 0.3 % — SIGNIFICANT CHANGE UP (ref 0–1.5)
INR BLD: 1.07 RATIO — SIGNIFICANT CHANGE UP (ref 0.88–1.16)
KETONES UR-MCNC: ABNORMAL
LEUKOCYTE ESTERASE UR-ACNC: ABNORMAL
LYMPHOCYTES # BLD AUTO: 2.69 K/UL — SIGNIFICANT CHANGE UP (ref 1–3.3)
LYMPHOCYTES # BLD AUTO: 38.9 % — SIGNIFICANT CHANGE UP (ref 13–44)
MCHC RBC-ENTMCNC: 30 PG — SIGNIFICANT CHANGE UP (ref 27–34)
MCHC RBC-ENTMCNC: 32.2 GM/DL — SIGNIFICANT CHANGE UP (ref 32–36)
MCV RBC AUTO: 92.9 FL — SIGNIFICANT CHANGE UP (ref 80–100)
MONOCYTES # BLD AUTO: 0.53 K/UL — SIGNIFICANT CHANGE UP (ref 0–0.9)
MONOCYTES NFR BLD AUTO: 7.7 % — SIGNIFICANT CHANGE UP (ref 2–14)
NEUTROPHILS # BLD AUTO: 3.51 K/UL — SIGNIFICANT CHANGE UP (ref 1.8–7.4)
NEUTROPHILS NFR BLD AUTO: 50.7 % — SIGNIFICANT CHANGE UP (ref 43–77)
NITRITE UR-MCNC: POSITIVE
NRBC # BLD: 0 /100 WBCS — SIGNIFICANT CHANGE UP (ref 0–0)
PH UR: 7 — SIGNIFICANT CHANGE UP (ref 5–8)
PLATELET # BLD AUTO: 338 K/UL — SIGNIFICANT CHANGE UP (ref 150–400)
POTASSIUM SERPL-MCNC: 3.8 MMOL/L — SIGNIFICANT CHANGE UP (ref 3.5–5.3)
POTASSIUM SERPL-SCNC: 3.8 MMOL/L — SIGNIFICANT CHANGE UP (ref 3.5–5.3)
PROT SERPL-MCNC: 7.7 GM/DL — SIGNIFICANT CHANGE UP (ref 6–8.3)
PROT UR-MCNC: 100 MG/DL
PROTHROM AB SERPL-ACNC: 11.9 SEC — SIGNIFICANT CHANGE UP (ref 10–12.9)
RBC # BLD: 2.97 M/UL — LOW (ref 3.8–5.2)
RBC # FLD: 13.8 % — SIGNIFICANT CHANGE UP (ref 10.3–14.5)
RBC CASTS # UR COMP ASSIST: >50 /HPF (ref 0–4)
SODIUM SERPL-SCNC: 142 MMOL/L — SIGNIFICANT CHANGE UP (ref 135–145)
SP GR SPEC: 1.01 — SIGNIFICANT CHANGE UP (ref 1.01–1.02)
TYPE + AB SCN PNL BLD: SIGNIFICANT CHANGE UP
UROBILINOGEN FLD QL: 1 MG/DL
WBC # BLD: 6.92 K/UL — SIGNIFICANT CHANGE UP (ref 3.8–10.5)
WBC # FLD AUTO: 6.92 K/UL — SIGNIFICANT CHANGE UP (ref 3.8–10.5)
WBC UR QL: SIGNIFICANT CHANGE UP

## 2019-04-07 PROCEDURE — 99283 EMERGENCY DEPT VISIT LOW MDM: CPT

## 2019-04-07 PROCEDURE — 76830 TRANSVAGINAL US NON-OB: CPT | Mod: 26

## 2019-04-07 PROCEDURE — 99285 EMERGENCY DEPT VISIT HI MDM: CPT

## 2019-04-07 RX ORDER — SODIUM CHLORIDE 9 MG/ML
1000 INJECTION INTRAMUSCULAR; INTRAVENOUS; SUBCUTANEOUS ONCE
Qty: 0 | Refills: 0 | Status: COMPLETED | OUTPATIENT
Start: 2019-04-07 | End: 2019-04-07

## 2019-04-07 RX ORDER — NORGESTIMATE AND ETHINYL ESTRADIOL 7DAYSX3 LO
1 KIT ORAL
Qty: 30 | Refills: 1 | OUTPATIENT
Start: 2019-04-07 | End: 2019-06-05

## 2019-04-07 RX ADMIN — SODIUM CHLORIDE 2000 MILLILITER(S): 9 INJECTION INTRAMUSCULAR; INTRAVENOUS; SUBCUTANEOUS at 16:46

## 2019-04-07 RX ADMIN — SODIUM CHLORIDE 1000 MILLILITER(S): 9 INJECTION INTRAMUSCULAR; INTRAVENOUS; SUBCUTANEOUS at 17:46

## 2019-04-07 NOTE — ED ADULT NURSE NOTE - OBJECTIVE STATEMENT
c/o vaginal bleeding x11 days. Pt reports being on her menstrual cycle for 11 days now, changing her pad every 15 minutes. Over the past 24 hours, she went through 28 pads. +dizziness +lower abd pain +fatigue.

## 2019-04-07 NOTE — ED ADULT NURSE NOTE - NSIMPLEMENTINTERV_GEN_ALL_ED
Implemented All Universal Safety Interventions:  [place] to call system. Call bell, personal items and telephone within reach. Instruct patient to call for assistance. Room bathroom lighting operational. Non-slip footwear when patient is off stretcher. Physically safe environment: no spills, clutter or unnecessary equipment. Stretcher in lowest position, wheels locked, appropriate side rails in place.

## 2019-04-07 NOTE — ED STATDOCS - ATTENDING CONTRIBUTION TO CARE
I, Rajan Santos, performed the initial face to face bedside interview with this patient regarding history of present illness, review of symptoms and relevant past medical, social and family history.  I completed an independent physical examination.  I was the initial provider who evaluated this patient. I have signed out the follow up of any pending tests (i.e. labs, radiological studies) to the ACP.  I have communicated the patient’s plan of care and disposition with the ACP.  The history, relevant review of systems, past medical and surgical history, medical decision making, and physical examination was documented by the scribe in my presence and I attest to the accuracy of the documentation.

## 2019-04-07 NOTE — ED STATDOCS - OBJECTIVE STATEMENT
47 y/o female with PMHx of anemia presents to the ED c/o vaginal bleeding x11 days. Pt reports being on her menstrual cycle for 11 days now, changing her pad every 15 minutes. Over the past 24 hours, she went through 28 pads. +dizziness +lower abd pain +fatigue. Pt has experienced this vaginal bleeding in the past. Denies SOB, chest pain. Pt reports 2 menstrual cycles in the past month, LNMP 3/7/18. 3 months ago, pt's doctor said pt has an infection of her uterus, treated with abx. No hx of uterine fibroids, ovarian cysts. [place]  used, ID#: [id number].

## 2019-04-07 NOTE — CONSULT NOTE ADULT - ATTENDING COMMENTS
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

## 2019-04-07 NOTE — ED STATDOCS - PROGRESS NOTE DETAILS
47 y/o female with PMHx of anemia presents to the ED c/o vaginal bleeding x11 days. Pt reports being on her menstrual cycle for 11 days now, changing her pad every 15 minutes. Over the past 24 hours, she went through 28 pads. +dizziness +lower abd pain +fatigue. Pt has experienced this vaginal bleeding in the past. Denies SOB, chest pain. Pt reports 2 menstrual cycles in the past month, LNMP 3/7/18. 3 months ago, pt's doctor said pt has an infection of her uterus, treated with abx. No hx of uterine fibroids, ovarian cysts. Pacific  used, ID#: [id number].  Maricel Domingo PA-C Pt. was worked up at [facility] for same 2/15.  Pt. refused medication or IUD.  Maricel Domingo PA-C Dr. Ng gyn will be in to evaluate patient.  Maricel Domingo PA-C Dr. Ng gyn will be in to evaluate patient.  H/H 1/19 8/27 12/37 today.   Maricel Domingo PA-C Dr. Reeves evaluated patient and provided aftercare instructions to her to follow with Dr. Fournier.  Rx sen by me for him.  Maricel Domingo PA-C Dr. Reeves prior to DC stated he will follow with patient this week not Dr. Fournier.  Maricel Domingo PA-C

## 2019-04-07 NOTE — ED STATDOCS - CARE PROVIDER_API CALL
Melody Fournier)  Obstetrics and Gynecology  [address] [place] [address]  Phone: [phone]  Fax: [phone]  Follow Up Time:

## 2019-04-07 NOTE — CONSULT NOTE ADULT - ASSESSMENT
47 yo female with h/o anemia presenting with c/o heavy vaginal bleeding since 3/28/19 with associated sharp lower abdominal pain and pain in the legs. Partially necrotic lesion noted in the the anterior uterine myometrium noted on CT in site of previously known leiomyoma. Patient stable.     -Can discharge home with pain medications  -will start birth control (sprintec) for control of bleeding   -Follow up with Dr. Briseno as outpatient for further workup.

## 2019-04-07 NOTE — CONSULT NOTE ADULT - SUBJECTIVE AND OBJECTIVE BOX
47 yo female with h/o anemia presenting with c/o heavy vaginal bleeding since 3/28/19 with associated sharp lower abdominal pain and pain in the legs. Pt reports that her sxs initially started in February 2019, had " 2 periods in the same month, for which she was seen by her GYN doctor, Dr. Fournier. Per pt, an in-office pelvic sonogram was performed and was told that she had an "infection of the uterus" and required "removal of the uterus". As per pt, she was referred to [facility] as a result. No procedure was performed at [place] however as it was deemed not needed, per pt.      GYN hx: last regular menses was 6 months ago, prior to that was having 1 regular menses/month lasting 5-7 days; menarche at age 14  PMH: anemia, denies any other medical problems, denies any blood-related issues  PSH: cholecystectomy in 2019  Meds: antibiotics (pt does not know abx name, but takes 1 pill/day, pt is also unsure about how long she's supposed to take it for)  ALL: NKDA [place]  used: [id number]    49 yo female with h/o anemia presenting with c/o heavy vaginal bleeding since 3/28/19 with associated sharp lower abdominal pain and pain in the legs. Pt reports that her sxs initially started in February 2019, had " 2 periods in the same month, for which she was seen by her GYN doctor, Dr. Fournier. Per pt, an in-office pelvic sonogram was performed and was told that she had an "infection of the uterus" and required "removal of the uterus". As per pt, she was referred to [facility] as a result. No procedure was performed at [place] however as it was deemed not necessary, per pt. Pt reports recurrent of heavy bleeding on March 8, lasted for 9 days and resolved, bleeding returned again 3/28. Per pt she has been having heavy bleeding since, with passing of clots, has been changing pads every 15 minutes.       GYN hx: last regular menses was 6 months ago, prior to that was having 1 regular menses/month lasting 5-7 days; menarche at age 14  PMH: anemia, denies any other medical problems, denies any blood-related issues  PSH: cholecystectomy in 2019  Meds: antibiotics (pt does not know abx name, but takes 1 pill/day, pt is also unsure about how long she's supposed to take it for)  ALL: NKDA      Vital Signs Last 24 Hrs  T(C): 37 (07 Apr 2019 14:58), Max: 37 (07 Apr 2019 14:58)  T(F): 98.6 (07 Apr 2019 14:58), Max: 98.6 (07 Apr 2019 14:58)  HR: 98 (07 Apr 2019 14:58) (98 - 98)  BP: 125/86 (07 Apr 2019 14:58) (125/86 - 125/86)  BP(mean): --  RR: 17 (07 Apr 2019 14:58) (17 - 17)  SpO2: 100% (07 Apr 2019 14:58) (100% - 100%)    PE:  General: NAD, pt is comfortable  Pelvic exam:                                  8.9    6.92  )-----------( 338      ( 07 Apr 2019 16:05 )             27.6       < from: US Transvaginal (04.07.19 @ 17:14) >  FINDINGS:    Uterus: 10.5 x 7.9 x 7.9 cm. Complex partially necrotic lesion within the   anterior uterine myometrium measuring 4.4 x 4.8 x 4.4 cm with associated   vascularity, in site of previously visualized leiomyoma, however given   the rapid increase in size and new internal vascularity, malignant   transformation cannot be entirely excluded.     Endometrium: 6 mm. Within normal limits.    Right ovary: 2.8 x 1.9 x 1.9 cm. Normal vascularity to the right ovary.     Left ovary: 4.2 x 2.4 x 1.8 cm. Normal vascularity to the left ovary.   Cyst versus dominant follicle in the left ovary measuring 1.7 x 1.9 x 0.8   cm.    Fluid: None.    IMPRESSION:    Complex partially necrotic lesion within the anterior uterine myometrium   measuring 4.4 x 4.8 x 4.4 cm with associated vascularity, in site of   previously visualized leiomyoma, however given the rapid increase in size   and new internal vascularity, malignant transformation cannot be entirely   excluded.     Cyst versus dominant follicle in the left ovary measuring 1.7 x 1.9 x 0.8   cm.    This was discussed with Dr. RANI Neil of the ED.          ESMER BURNS M.D., ATTENDING RADIOLOGIST  This document has been electronically signed. Apr 7 2019  5:27PM        < end of copied text > Pacific  used: [id number]    49 yo female with h/o anemia presenting with c/o heavy vaginal bleeding since 3/28/19 with associated sharp lower abdominal pain and pain in the legs. Pt reports that her sxs initially started in February 2019, had " 2 periods in the same month, for which she was seen by her GYN doctor, Dr. Fournier. Per pt, an in-office pelvic sonogram was performed and was told that she had an "infection of the uterus" and required "removal of the uterus". As per pt, she was referred to [facility] as a result. No procedure was performed at [place] however as it was deemed not necessary, per pt. Pt reports recurrent of heavy bleeding on March 8, lasted for 9 days and resolved, bleeding returned again 3/28. Per pt she has been having heavy bleeding since, with passing of clots, has been changing pads every 15 minutes.       GYN hx: last regular menses was 6 months ago, prior to that was having 1 regular menses/month lasting 5-7 days; menarche at age 14  PMH: anemia, denies any other medical problems, denies any blood-related issues  PSH: cholecystectomy in 2019  Meds: antibiotics (pt does not know abx name, but takes 1 pill/day, pt is also unsure about how long she's supposed to take it for)  ALL: NKDA      Vital Signs Last 24 Hrs  T(C): 37 (07 Apr 2019 14:58), Max: 37 (07 Apr 2019 14:58)  T(F): 98.6 (07 Apr 2019 14:58), Max: 98.6 (07 Apr 2019 14:58)  HR: 98 (07 Apr 2019 14:58) (98 - 98)  BP: 125/86 (07 Apr 2019 14:58) (125/86 - 125/86)  BP(mean): --  RR: 17 (07 Apr 2019 14:58) (17 - 17)  SpO2: 100% (07 Apr 2019 14:58) (100% - 100%)    PE:  General: NAD, pt is comfortable  Pelvic exam: blood noted on exam. no abnormal findings.                                   8.9    6.92  )-----------( 338      ( 07 Apr 2019 16:05 )             27.6       < from: US Transvaginal (04.07.19 @ 17:14) >  FINDINGS:    Uterus: 10.5 x 7.9 x 7.9 cm. Complex partially necrotic lesion within the   anterior uterine myometrium measuring 4.4 x 4.8 x 4.4 cm with associated   vascularity, in site of previously visualized leiomyoma, however given   the rapid increase in size and new internal vascularity, malignant   transformation cannot be entirely excluded.     Endometrium: 6 mm. Within normal limits.    Right ovary: 2.8 x 1.9 x 1.9 cm. Normal vascularity to the right ovary.     Left ovary: 4.2 x 2.4 x 1.8 cm. Normal vascularity to the left ovary.   Cyst versus dominant follicle in the left ovary measuring 1.7 x 1.9 x 0.8   cm.    Fluid: None.    IMPRESSION:    Complex partially necrotic lesion within the anterior uterine myometrium   measuring 4.4 x 4.8 x 4.4 cm with associated vascularity, in site of   previously visualized leiomyoma, however given the rapid increase in size   and new internal vascularity, malignant transformation cannot be entirely   excluded.     Cyst versus dominant follicle in the left ovary measuring 1.7 x 1.9 x 0.8   cm.    This was discussed with Dr. RANI Neil of the ED.          ESMER BURNS M.D., ATTENDING RADIOLOGIST  This document has been electronically signed. Apr 7 2019  5:27PM        < end of copied text > Pacific  used: [id number]    49 yo female with h/o anemia presenting with c/o heavy vaginal bleeding since 3/28/19 with associated sharp lower abdominal pain and pain in the legs. Pt reports that her sxs initially started in February 2019, had " 2 periods in the same month, for which she was seen by her GYN doctor, Dr. Fournier. Per pt, an in-office pelvic sonogram was performed and was told that she had an "infection of the uterus" and required "removal of the uterus". As per pt, she was referred to [facility] as a result. No procedure was performed at [place] however as it was deemed not necessary, per pt. Pt reports recurrent of heavy bleeding on March 8, lasted for 9 days and resolved, bleeding returned again 3/28. Per pt she has been having heavy bleeding since, with passing of clots, has been changing pads every 15 minutes.       GYN hx: last regular menses was 6 months ago, prior to that was having 1 regular menses/month lasting 5-7 days; menarche at age 14  PMH: anemia, denies any other medical problems, denies any blood-related issues  PSH: cholecystectomy in 2019  Meds: antibiotics (pt does not know abx name, but takes 1 pill/day, pt is also unsure about how long she's supposed to take it for)  ALL: NKDA  FH - no sig hx of ca/HTN/DM      Vital Signs Last 24 Hrs  T(C): 37 (07 Apr 2019 14:58), Max: 37 (07 Apr 2019 14:58)  T(F): 98.6 (07 Apr 2019 14:58), Max: 98.6 (07 Apr 2019 14:58)  HR: 98 (07 Apr 2019 14:58) (98 - 98)  BP: 125/86 (07 Apr 2019 14:58) (125/86 - 125/86)  BP(mean): --  RR: 17 (07 Apr 2019 14:58) (17 - 17)  SpO2: 100% (07 Apr 2019 14:58) (100% - 100%)    PE:  General: NAD, pt is comfortable  Ab - soft/mild tenderness/no rebound/ no guarding  Pelvic exam: blood noted on exam. no abnormal findings.   Ut - 10-12 weeks/mildly tender/no CMT/no adnexal tenderness  Ext - No C/C/E, no calf pain                          8.9    6.92  )-----------( 338      ( 07 Apr 2019 16:05 )             27.6       < from: US Transvaginal (04.07.19 @ 17:14) >  FINDINGS:    Uterus: 10.5 x 7.9 x 7.9 cm. Complex partially necrotic lesion within the   anterior uterine myometrium measuring 4.4 x 4.8 x 4.4 cm with associated   vascularity, in site of previously visualized leiomyoma, however given   the rapid increase in size and new internal vascularity, malignant   transformation cannot be entirely excluded.     Endometrium: 6 mm. Within normal limits.    Right ovary: 2.8 x 1.9 x 1.9 cm. Normal vascularity to the right ovary.     Left ovary: 4.2 x 2.4 x 1.8 cm. Normal vascularity to the left ovary.   Cyst versus dominant follicle in the left ovary measuring 1.7 x 1.9 x 0.8   cm.    Fluid: None.    IMPRESSION:    Complex partially necrotic lesion within the anterior uterine myometrium   measuring 4.4 x 4.8 x 4.4 cm with associated vascularity, in site of   previously visualized leiomyoma, however given the rapid increase in size   and new internal vascularity, malignant transformation cannot be entirely   excluded.     Cyst versus dominant follicle in the left ovary measuring 1.7 x 1.9 x 0.8   cm.    This was discussed with Dr. RANI Neil of the ED.          ESMER BURNS M.D., ATTENDING RADIOLOGIST  This document has been electronically signed. Apr 7 2019  5:27PM        < end of copied text >

## 2019-04-07 NOTE — ED ADULT NURSE NOTE - INTEGUMENTARY WDL
Progress Notes by Gowers, Molly at 02/01/17 10:12 AM     Author:  Gowers, Molly Service:  (none) Author Type:  Patient      Filed:  02/02/17 09:37 AM Encounter Date:  2/1/2017 Status:  Signed     :  Gowers, Molly (Patient )            Opened in error[MG1.1M]      Revision History        User Key Date/Time User Provider Type Action    > MG1.1 02/02/17 09:37 AM Gowers, Molly Patient  Sign    M - Manual             Color consistent with ethnicity/race, warm, dry intact, resilient.

## 2019-04-12 ENCOUNTER — APPOINTMENT (OUTPATIENT)
Dept: GYNECOLOGIC ONCOLOGY | Facility: CLINIC | Age: 49
End: 2019-04-12
Payer: SELF-PAY

## 2019-04-12 VITALS
BODY MASS INDEX: 33.32 KG/M2 | WEIGHT: 200 LBS | SYSTOLIC BLOOD PRESSURE: 121 MMHG | HEIGHT: 65 IN | HEART RATE: 96 BPM | DIASTOLIC BLOOD PRESSURE: 81 MMHG | TEMPERATURE: 99.3 F | OXYGEN SATURATION: 99 %

## 2019-04-12 DIAGNOSIS — Z86.018 PERSONAL HISTORY OF OTHER BENIGN NEOPLASM: ICD-10-CM

## 2019-04-12 DIAGNOSIS — N94.6 DYSMENORRHEA, UNSPECIFIED: ICD-10-CM

## 2019-04-12 DIAGNOSIS — D64.9 ANEMIA, UNSPECIFIED: ICD-10-CM

## 2019-04-12 DIAGNOSIS — R10.32 LEFT LOWER QUADRANT PAIN: ICD-10-CM

## 2019-04-12 PROCEDURE — 76830 TRANSVAGINAL US NON-OB: CPT | Mod: 59

## 2019-04-12 PROCEDURE — 99205 OFFICE O/P NEW HI 60 MIN: CPT | Mod: 25

## 2019-04-12 PROCEDURE — 76857 US EXAM PELVIC LIMITED: CPT | Mod: 59

## 2019-04-12 PROCEDURE — 76376 3D RENDER W/INTRP POSTPROCES: CPT | Mod: TC,59

## 2019-04-12 RX ORDER — FERROUS SULFATE 325(65) MG
325 TABLET ORAL
Refills: 0 | Status: ACTIVE | COMMUNITY

## 2019-04-12 NOTE — CHIEF COMPLAINT
[FreeTextEntry1] : [facility]\par \par [facility] [phone] at [address] [place] [id number]\par

## 2019-04-12 NOTE — ASSESSMENT
[FreeTextEntry1] : I discussed at length with the patient the nature, purpose, risks, benefits, and alternatives of Robot assisted total laparoscopic hysterectomy with bilateral salpingectomy.  The patient understands the risks to include (but not be limited to) bowel injury, bleeding (with the possible need for transfusion), bladder or ureteral injury, infections, deep venous thrombosis, and lary-operative death.  The patient also understands that her surgery may not be able to be performed robotically and that she may need a laparotomy.  She also understands the limitations of robotic surgery and the possibility of missing a surgical complication with need for subsequent re-exploration.  She agrees to proceed.  She asked numerous questions which were answered to her satisfaction.  She understands the need for a pre-operative bowel preparation and agrees to comply with our instructions.  She also understands the rationale for a cystoscopy at the completion of the procedure and the potential risks of cystoscopy.\par

## 2019-04-12 NOTE — END OF VISIT
[FreeTextEntry3] : Written by Nancy ARCE, acting as a scribe for Dr. Steven Copeland.\par This note accurately reflects the work and decisions made by me.\par

## 2019-04-12 NOTE — PHYSICAL EXAM
[Abnormal] : General appearance: Abnormal [Normal] : Parametria: Normal [FreeTextEntry1] : pale and tired [de-identified] : Patient was interviewed and examined with chaperone present. Name of chaperone: Nancy Zuniga

## 2019-04-12 NOTE — HISTORY OF PRESENT ILLNESS
[FreeTextEntry1] : This 47yo ,  x 4 has heavy irregular bleeding occurring twice in the past month. Pt describes passage of large clots and recently bled for 11 days. She presented to [place] [facility] on 19 with worsening pelvic pains and a pelvic sonogram showed a complex partially necrotic lesion within the anterior uterine myometrium measuring 4.4 x 4.8 x 4.4cm with associated vascularity in site of previously visualized leiomyoma. Given rapid increase, sarcoma is suspected. Pt was found to be anemic with Hb of 8.9. She admits to fatigue and dizziness. Complains of n/v and loss of appetite for past 5 days. On OTC iron 3x daily. \par \par Pap [id number] abnormal paps\par [id number]\par Colonscopy-2019-colitis

## 2019-04-14 ENCOUNTER — INPATIENT (INPATIENT)
Facility: HOSPITAL | Age: 49
LOS: 1 days | Discharge: HOME | DRG: 983 | End: 2019-04-16
Attending: OBSTETRICS & GYNECOLOGY | Admitting: OBSTETRICS & GYNECOLOGY
Payer: MEDICAID

## 2019-04-14 VITALS
TEMPERATURE: 98 F | HEART RATE: 99 BPM | WEIGHT: 199.96 LBS | DIASTOLIC BLOOD PRESSURE: 65 MMHG | SYSTOLIC BLOOD PRESSURE: 112 MMHG | OXYGEN SATURATION: 99 % | HEIGHT: 65 IN | RESPIRATION RATE: 20 BRPM

## 2019-04-14 DIAGNOSIS — Z90.49 ACQUIRED ABSENCE OF OTHER SPECIFIED PARTS OF DIGESTIVE TRACT: Chronic | ICD-10-CM

## 2019-04-14 DIAGNOSIS — N92.0 EXCESSIVE AND FREQUENT MENSTRUATION WITH REGULAR CYCLE: ICD-10-CM

## 2019-04-14 DIAGNOSIS — N93.9 ABNORMAL UTERINE AND VAGINAL BLEEDING, UNSPECIFIED: ICD-10-CM

## 2019-04-14 LAB
ALBUMIN SERPL ELPH-MCNC: 4.1 G/DL — SIGNIFICANT CHANGE UP (ref 3.3–5.2)
ALP SERPL-CCNC: 69 U/L — SIGNIFICANT CHANGE UP (ref 40–120)
ALT FLD-CCNC: 33 U/L — HIGH
ANION GAP SERPL CALC-SCNC: 12 MMOL/L — SIGNIFICANT CHANGE UP (ref 5–17)
APTT BLD: 25.1 SEC — LOW (ref 27.5–36.3)
AST SERPL-CCNC: 30 U/L — SIGNIFICANT CHANGE UP
BASOPHILS # BLD AUTO: 0 K/UL — SIGNIFICANT CHANGE UP (ref 0–0.2)
BASOPHILS NFR BLD AUTO: 0.6 % — SIGNIFICANT CHANGE UP (ref 0–2)
BILIRUB SERPL-MCNC: <0.2 MG/DL — LOW (ref 0.4–2)
BLD GP AB SCN SERPL QL: SIGNIFICANT CHANGE UP
BUN SERPL-MCNC: 11 MG/DL — SIGNIFICANT CHANGE UP (ref 8–20)
CALCIUM SERPL-MCNC: 8.9 MG/DL — SIGNIFICANT CHANGE UP (ref 8.6–10.2)
CHLORIDE SERPL-SCNC: 102 MMOL/L — SIGNIFICANT CHANGE UP (ref 98–107)
CO2 SERPL-SCNC: 21 MMOL/L — LOW (ref 22–29)
CREAT SERPL-MCNC: 0.61 MG/DL — SIGNIFICANT CHANGE UP (ref 0.5–1.3)
EOSINOPHIL # BLD AUTO: 0.1 K/UL — SIGNIFICANT CHANGE UP (ref 0–0.5)
EOSINOPHIL NFR BLD AUTO: 0.9 % — SIGNIFICANT CHANGE UP (ref 0–6)
GLUCOSE SERPL-MCNC: 90 MG/DL — SIGNIFICANT CHANGE UP (ref 70–115)
HCG SERPL-ACNC: <4 MIU/ML — SIGNIFICANT CHANGE UP
HCT VFR BLD CALC: 26.8 % — LOW (ref 37–47)
HGB BLD-MCNC: 8.4 G/DL — LOW (ref 12–16)
INR BLD: 1.03 RATIO — SIGNIFICANT CHANGE UP (ref 0.88–1.16)
LYMPHOCYTES # BLD AUTO: 2.5 K/UL — SIGNIFICANT CHANGE UP (ref 1–4.8)
LYMPHOCYTES # BLD AUTO: 31.9 % — SIGNIFICANT CHANGE UP (ref 20–55)
MCHC RBC-ENTMCNC: 28.3 PG — SIGNIFICANT CHANGE UP (ref 27–31)
MCHC RBC-ENTMCNC: 31.3 G/DL — LOW (ref 32–36)
MCV RBC AUTO: 90.2 FL — SIGNIFICANT CHANGE UP (ref 81–99)
MONOCYTES # BLD AUTO: 0.4 K/UL — SIGNIFICANT CHANGE UP (ref 0–0.8)
MONOCYTES NFR BLD AUTO: 5.2 % — SIGNIFICANT CHANGE UP (ref 3–10)
NEUTROPHILS # BLD AUTO: 4.8 K/UL — SIGNIFICANT CHANGE UP (ref 1.8–8)
NEUTROPHILS NFR BLD AUTO: 61.3 % — SIGNIFICANT CHANGE UP (ref 37–73)
PLATELET # BLD AUTO: 419 K/UL — HIGH (ref 150–400)
POTASSIUM SERPL-MCNC: 4.1 MMOL/L — SIGNIFICANT CHANGE UP (ref 3.5–5.3)
POTASSIUM SERPL-SCNC: 4.1 MMOL/L — SIGNIFICANT CHANGE UP (ref 3.5–5.3)
PROT SERPL-MCNC: 7.9 G/DL — SIGNIFICANT CHANGE UP (ref 6.6–8.7)
PROTHROM AB SERPL-ACNC: 11.9 SEC — SIGNIFICANT CHANGE UP (ref 10–12.9)
RBC # BLD: 2.97 M/UL — LOW (ref 4.4–5.2)
RBC # FLD: 14 % — SIGNIFICANT CHANGE UP (ref 11–15.6)
SODIUM SERPL-SCNC: 135 MMOL/L — SIGNIFICANT CHANGE UP (ref 135–145)
TYPE + AB SCN PNL BLD: SIGNIFICANT CHANGE UP
WBC # BLD: 7.8 K/UL — SIGNIFICANT CHANGE UP (ref 4.8–10.8)
WBC # FLD AUTO: 7.8 K/UL — SIGNIFICANT CHANGE UP (ref 4.8–10.8)

## 2019-04-14 PROCEDURE — 99285 EMERGENCY DEPT VISIT HI MDM: CPT

## 2019-04-14 RX ORDER — OXYCODONE AND ACETAMINOPHEN 5; 325 MG/1; MG/1
2 TABLET ORAL EVERY 4 HOURS
Qty: 0 | Refills: 0 | Status: DISCONTINUED | OUTPATIENT
Start: 2019-04-14 | End: 2019-04-16

## 2019-04-14 RX ORDER — SODIUM CHLORIDE 9 MG/ML
1000 INJECTION INTRAMUSCULAR; INTRAVENOUS; SUBCUTANEOUS
Qty: 0 | Refills: 0 | Status: DISCONTINUED | OUTPATIENT
Start: 2019-04-14 | End: 2019-04-16

## 2019-04-14 RX ORDER — SODIUM CHLORIDE 9 MG/ML
3 INJECTION INTRAMUSCULAR; INTRAVENOUS; SUBCUTANEOUS EVERY 8 HOURS
Qty: 0 | Refills: 0 | Status: DISCONTINUED | OUTPATIENT
Start: 2019-04-14 | End: 2019-04-16

## 2019-04-14 RX ORDER — OXYCODONE AND ACETAMINOPHEN 5; 325 MG/1; MG/1
1 TABLET ORAL EVERY 4 HOURS
Qty: 0 | Refills: 0 | Status: DISCONTINUED | OUTPATIENT
Start: 2019-04-14 | End: 2019-04-16

## 2019-04-14 RX ORDER — ONDANSETRON 8 MG/1
4 TABLET, FILM COATED ORAL EVERY 6 HOURS
Qty: 0 | Refills: 0 | Status: DISCONTINUED | OUTPATIENT
Start: 2019-04-14 | End: 2019-04-16

## 2019-04-14 RX ADMIN — SODIUM CHLORIDE 3 MILLILITER(S): 9 INJECTION INTRAMUSCULAR; INTRAVENOUS; SUBCUTANEOUS at 13:55

## 2019-04-14 RX ADMIN — SODIUM CHLORIDE 3 MILLILITER(S): 9 INJECTION INTRAMUSCULAR; INTRAVENOUS; SUBCUTANEOUS at 21:51

## 2019-04-14 RX ADMIN — SODIUM CHLORIDE 125 MILLILITER(S): 9 INJECTION INTRAMUSCULAR; INTRAVENOUS; SUBCUTANEOUS at 13:55

## 2019-04-14 NOTE — ED STATDOCS - NS ED ROS FT
ROS: CONTUSIONAL: Denies fever, chills, fatigue, wt loss. HEAD: Denies trauma, HA, Dizziness. EYE: Denies Acute visual changes, diplopia. ENMT: Denies change in hearing, tinnitus, epistaxis, difficulty swallowing, sore throat. CARDIO: Denies CP, palpitations, edema. RESP: Denies Cough, SOB , Diff breathing, hemoptysis. GI: (+) N/V, ABD pain/discomfort, vaginal bleeding (-) change in bowel movement. URINARY: Denies difficulty urinating, pelvic pain. MS:  Denies joint pain, back pain, weakness, decreased ROM, swelling. NEURO: (+) dizziness, lightheaded (-) Denies seizures, loss of sensation, confusion LOC.  PSY: NO SI/HI.

## 2019-04-14 NOTE — ED ADULT TRIAGE NOTE - CHIEF COMPLAINT QUOTE
sent by Gyn for possible hysterectomy. ABD pain. earlier bleeding. Due for hysterectomy Tuesday, also sent for abnormal labs.

## 2019-04-14 NOTE — H&P ADULT - ASSESSMENT
this is a 47yo female with uterine fibroids and vaginal bleeding admitted for symptomatic anemia possible blood transfusion and a hysterectomy on Tuesday with Dr Copeland    Plan: admit to gyn/onc Dr Steven Copeland  stat CBC, BMP, type and screen  IVF  zofran for nausea  percocet for pain  regular diet  ambulate as tolerated  plan for OR on Tuesday for a hysterectomy  plan discussed with Dr Copeland  will continue to follow

## 2019-04-14 NOTE — ED STATDOCS - ATTENDING CONTRIBUTION TO CARE
I, Jaylene Benitez, performed the initial face to face bedside interview with this patient regarding history of present illness, review of symptoms and relevant past medical, social and family history.  I completed an independent physical examination.  I was the initial provider who evaluated this patient. I have signed out the follow up of any pending tests (i.e. labs, radiological studies) to the ACP.  I have communicated the patient’s plan of care and disposition with the ACP.

## 2019-04-14 NOTE — H&P ADULT - HISTORY OF PRESENT ILLNESS
this is a 47yo female presenting to the ED c/o abdominal pain and vaginal bleeding for the past 3 weeks. She has a past history of fibroids and had a Laparoscopic cholecystectomy in January of 2019.   She was sent here by Dr Copeland to be admitted for symptomatic anemia and possible blood transfusion pending lab results. She is scheduled for a hysterectomy on Tuesday with Dr Copeland. Her pain has progressively worsened and has been taking tylenol and motrin with some relief. Pt also c/o dizzy and lightheadedness with nausea and vomitting. Pt Denies, CP,SOB, diarrhea and urinary complaints.

## 2019-04-14 NOTE — ED STATDOCS - OBJECTIVE STATEMENT
49 y/o F pt with hx of uterine fibroids and cholecystectomy presents to the ED sent by Gurwinder Copeland for possible hysterectomy c/o constant worsening vaginal bleeding for 24 days with assoc. lower abdominal discomfort, nausea, and vomiting everyday. Pt notes dizziness, and lightheaded. Pt using about 20 pads per day. Pt recently had labs, and showed a hemoglobin of 9. Pt states she has a scheduled hysterectomy for Tuesday, however was sent in by her GYN. Denies f/c, CP, SOB, diarrhea and urinary complaints. No further complaints at this time.

## 2019-04-14 NOTE — H&P ADULT - NSHPPHYSICALEXAM_GEN_ALL_CORE
General- Awake and A&Ox3 in some mild distress with oral pallor noted  Chest- CTA BL, no wheezes or rales  Cardiac- NSR, s1 s2 without murmur or rub  Abd- soft, ND NT to palpation, bowel sounds present, no gaurding or rebound  Ext- no swelling or calf tenderness BL, homans sign is negative

## 2019-04-14 NOTE — ED STATDOCS - CLINICAL SUMMARY MEDICAL DECISION MAKING FREE TEXT BOX
Symptomatic anemia, most likely will need transfusion, await on pending labs, admit to Gurwinder Copeland for hysterectomy on Tuesday.

## 2019-04-14 NOTE — ED STATDOCS - PHYSICAL EXAMINATION
Head: atraumatic, normacephalic  Face: atraumatic, no crepitus no orbiral/maxillary/mandibular ttp  throat: uvula midline no exudates  eyes: Pale conjuncitva  heart: rrr s1s2  lungs: ctab  abd: soft, non-distended abdomen with mild suprapubic discomfort, L sided discomfort with no rebound, no guarding.  skin: warm  LE: no swelling, no calf ttp  back: no midline cervical/thoracic/lumbar ttp

## 2019-04-14 NOTE — ED STATDOCS - PROGRESS NOTE DETAILS
PT evaluated by intake physician. HPI/ROS/PE as noted above. Will follow up plan per intake physician

## 2019-04-15 DIAGNOSIS — D25.9 LEIOMYOMA OF UTERUS, UNSPECIFIED: ICD-10-CM

## 2019-04-15 LAB
ALBUMIN SERPL ELPH-MCNC: 4 G/DL — SIGNIFICANT CHANGE UP (ref 3.3–5.2)
ALP SERPL-CCNC: 63 U/L — SIGNIFICANT CHANGE UP (ref 40–120)
ALT FLD-CCNC: 29 U/L — SIGNIFICANT CHANGE UP
ANION GAP SERPL CALC-SCNC: 13 MMOL/L — SIGNIFICANT CHANGE UP (ref 5–17)
AST SERPL-CCNC: 25 U/L — SIGNIFICANT CHANGE UP
BILIRUB SERPL-MCNC: 0.3 MG/DL — LOW (ref 0.4–2)
BUN SERPL-MCNC: 12 MG/DL — SIGNIFICANT CHANGE UP (ref 8–20)
CALCIUM SERPL-MCNC: 8.2 MG/DL — LOW (ref 8.6–10.2)
CHLORIDE SERPL-SCNC: 105 MMOL/L — SIGNIFICANT CHANGE UP (ref 98–107)
CO2 SERPL-SCNC: 21 MMOL/L — LOW (ref 22–29)
CREAT SERPL-MCNC: 0.65 MG/DL — SIGNIFICANT CHANGE UP (ref 0.5–1.3)
GLUCOSE SERPL-MCNC: 91 MG/DL — SIGNIFICANT CHANGE UP (ref 70–115)
HCT VFR BLD CALC: 26.5 % — LOW (ref 37–47)
HGB BLD-MCNC: 8.1 G/DL — LOW (ref 12–16)
MCHC RBC-ENTMCNC: 27.8 PG — SIGNIFICANT CHANGE UP (ref 27–31)
MCHC RBC-ENTMCNC: 30.6 G/DL — LOW (ref 32–36)
MCV RBC AUTO: 91.1 FL — SIGNIFICANT CHANGE UP (ref 81–99)
PLATELET # BLD AUTO: 408 K/UL — HIGH (ref 150–400)
POTASSIUM SERPL-MCNC: 4 MMOL/L — SIGNIFICANT CHANGE UP (ref 3.5–5.3)
POTASSIUM SERPL-SCNC: 4 MMOL/L — SIGNIFICANT CHANGE UP (ref 3.5–5.3)
PROT SERPL-MCNC: 7.1 G/DL — SIGNIFICANT CHANGE UP (ref 6.6–8.7)
RBC # BLD: 2.91 M/UL — LOW (ref 4.4–5.2)
RBC # FLD: 14.3 % — SIGNIFICANT CHANGE UP (ref 11–15.6)
SODIUM SERPL-SCNC: 139 MMOL/L — SIGNIFICANT CHANGE UP (ref 135–145)
WBC # BLD: 5.8 K/UL — SIGNIFICANT CHANGE UP (ref 4.8–10.8)
WBC # FLD AUTO: 5.8 K/UL — SIGNIFICANT CHANGE UP (ref 4.8–10.8)

## 2019-04-15 RX ORDER — ACETAMINOPHEN 500 MG
325 TABLET ORAL ONCE
Qty: 0 | Refills: 0 | Status: COMPLETED | OUTPATIENT
Start: 2019-04-15 | End: 2019-04-15

## 2019-04-15 RX ORDER — SODIUM CHLORIDE 9 MG/ML
1000 INJECTION, SOLUTION INTRAVENOUS
Qty: 0 | Refills: 0 | Status: DISCONTINUED | OUTPATIENT
Start: 2019-04-15 | End: 2019-04-16

## 2019-04-15 RX ORDER — MULTIVIT WITH MIN/MFOLATE/K2 340-15/3 G
1 POWDER (GRAM) ORAL ONCE
Qty: 0 | Refills: 0 | Status: COMPLETED | OUTPATIENT
Start: 2019-04-15 | End: 2019-04-15

## 2019-04-15 RX ADMIN — SODIUM CHLORIDE 3 MILLILITER(S): 9 INJECTION INTRAMUSCULAR; INTRAVENOUS; SUBCUTANEOUS at 05:43

## 2019-04-15 RX ADMIN — OXYCODONE AND ACETAMINOPHEN 1 TABLET(S): 5; 325 TABLET ORAL at 08:26

## 2019-04-15 RX ADMIN — SODIUM CHLORIDE 125 MILLILITER(S): 9 INJECTION INTRAMUSCULAR; INTRAVENOUS; SUBCUTANEOUS at 08:27

## 2019-04-15 RX ADMIN — Medication 325 MILLIGRAM(S): at 14:04

## 2019-04-15 RX ADMIN — SODIUM CHLORIDE 3 MILLILITER(S): 9 INJECTION INTRAMUSCULAR; INTRAVENOUS; SUBCUTANEOUS at 14:31

## 2019-04-15 RX ADMIN — Medication 1 BOTTLE: at 23:02

## 2019-04-15 RX ADMIN — Medication 1 BOTTLE: at 18:13

## 2019-04-15 RX ADMIN — OXYCODONE AND ACETAMINOPHEN 1 TABLET(S): 5; 325 TABLET ORAL at 08:00

## 2019-04-15 RX ADMIN — Medication 325 MILLIGRAM(S): at 15:00

## 2019-04-15 NOTE — PROGRESS NOTE ADULT - SUBJECTIVE AND OBJECTIVE BOX
The patient is a 48y old female who presents with a complaint of abdominal pain and vaginal  bleeding for 11 days. She is scheduled to have a ROBOTIC ASSISTED TOTAL LAPAROSCOPIC   HYSTERECTOMY, BILATERAL SALPINGECTOMY, & CYSTOSCOPY.                 (15 Apr 2019 12:40)    PAST MEDICAL HISTORY:  Uterine partially necrotic lesion 4.4 x 4.8 x 4.4 cms  Heavy vaginal bleeding x 11 days.  Anemia        PAST SURGICAL HISTORY:  Cholecystectomy 1/2019        MEDICATIONS  (STANDING):  magnesium citrate Oral Solution 1 Bottle Oral once  sodium chloride 0.9% lock flush 3 milliLiter(s) IV Push every 8 hours  sodium chloride 0.9%. 1000 milliLiter(s) (125 mL/Hr) IV Continuous <Continuous>    MEDICATIONS  (PRN):  ondansetron Injectable 4 milliGRAM(s) IV Push every 6 hours PRN Nausea and/or Vomiting  oxyCODONE    5 mG/acetaminophen 325 mG 1 Tablet(s) Oral every 4 hours PRN Moderate Pain (4 - 6)  oxyCODONE    5 mG/acetaminophen 325 mG 2 Tablet(s) Oral every 4 hours PRN Severe Pain (7 - 10)      Allergies:     No Known Drug Allergies      SOCIAL HISTORY:     The patient doesn't drink alcohol, smoke or use illicit drugs.                        8.1    5.8   )-----------( 408      ( 15 Apr 2019 09:35 )             26.5     PT/INR - ( 14 Apr 2019 13:59 )   PT: 11.9 sec;   INR: 1.03 ratio       PTT - ( 14 Apr 2019 13:59 )  PTT:25.1 sec    04-15    139  |  105  |  12.0  ----------------------------<  91  4.0   |  21.0<L>  |  0.65    Ca    8.2<L>      15 Apr 2019 09:35    TPro  7.1  /  Alb  4.0  /  TBili  0.3<L>  /  DBili  x   /  AST  25  /  ALT  29  /  AlkPhos  63  04-15    EKG:  -  4/14/2019  Normal sinus rhythm  Normal ECG    TT ECHO:  -  None     [place] TRANSVAGINAL  -  4/7/2019  FINDINGS:  Uterus: 10.5 x 7.9 x 7.9 cm. Complex partially necrotic lesion within the   anterior uterine myometrium measuring 4.4 x 4.8 x 4.4 cm with associated   vascularity, in site of previously visualized leiomyoma, however given   the rapid increase in size and new internal vascularity, malignant   transformation cannot be entirely excluded.   Cyst versus dominant follicle in the left ovary measuring 1.7 x 1.9 x 0.8   cm.  ASA # = 3  Mallampati # = 2           +

## 2019-04-15 NOTE — PROGRESS NOTE ADULT - SUBJECTIVE AND OBJECTIVE BOX
Patient seen for afternoon rounds. Patients hg noted to be 8.1 from 8.4, will give 1 unit PRBC's to get patient optimized for surgery tomorrow. Patient reports pain is better controlled this afternoon after taking percocet. She does report HA, will give PO tylenol and monitor. [employer] used for translation.

## 2019-04-15 NOTE — PROGRESS NOTE ADULT - PROBLEM SELECTOR PLAN 2
Planned for robotic hysterectomy 4/16  Pre-op testing/ orders placed    All of the above discussed with Dr. Copeland

## 2019-04-15 NOTE — PROGRESS NOTE ADULT - ASSESSMENT
48yoknown to our service for uterine fibroids, presented to ED with vaginal bleeding and admitted for symptomatic anemia. 49yo known to our service for uterine fibroids, presented to ED with vaginal bleeding and admitted for symptomatic anemia.

## 2019-04-15 NOTE — CHART NOTE - NSCHARTNOTEFT_GEN_A_CORE
Called by RN for infiltrated IV. Patient seen and examined with LUE IV noted to be red and mildly swollen at the site of insertion, non-erythematous, peripheral pulses and sensation intact. Patient reports irritation to the site, otherwise feeling ok. Multiple IV attempts by the RN staff and myself, which were unsuccessful. Will ask medicine PA if can be attempted as pt for OR tomorrow. If not discussed with RN that ok to hold IVFs overnight. Will continue to monitor.     HILL KauffmanC  [phone]

## 2019-04-15 NOTE — PROGRESS NOTE ADULT - SUBJECTIVE AND OBJECTIVE BOX
GYNECOLOGIC ONCOLOGY PROGRESS NOTE    POD#    PROBLEMS:  Excessive vaginal bleeding      Pt seen and examined at bedside. Reports abdominal pain 8/10 this morning without medication. Pt. states she was unaware that she had to ask for her pain medication but will now do so.     SUBJECTIVE:    Denies Nausea, Vomiting or Diarrhea.  Denies shortness of breath, chest pain or dyspnea on exertion.  Tolerating diet.  She denies VB overnight.    OBJECTIVE:     VITALS:  T(F): 97.9 (04-15-19 @ 08:01), Max: 99 (04-15-19 @ 04:13)  HR: 83 (04-15-19 @ 08:01) (68 - 99)  BP: 120/79 (04-15-19 @ 08:01) (100/60 - 120/79)  RR: 18 (04-15-19 @ 08:01) (18 - 20)  SpO2: 100% (04-15-19 @ 08:01) (98% - 100%)      MEDICATIONS  (STANDING):  magnesium citrate Oral Solution 1 Bottle Oral once  magnesium citrate Oral Solution 1 Bottle Oral once  sodium chloride 0.9% lock flush 3 milliLiter(s) IV Push every 8 hours  sodium chloride 0.9%. 1000 milliLiter(s) (125 mL/Hr) IV Continuous <Continuous>    MEDICATIONS  (PRN):  ondansetron Injectable 4 milliGRAM(s) IV Push every 6 hours PRN Nausea and/or Vomiting  oxyCODONE    5 mG/acetaminophen 325 mG 1 Tablet(s) Oral every 4 hours PRN Moderate Pain (4 - 6)  oxyCODONE    5 mG/acetaminophen 325 mG 2 Tablet(s) Oral every 4 hours PRN Severe Pain (7 - 10)      Physical Exam:  Constitutional: NAD  Pulmonary: clear to auscultation bilaterally   Cardiovascular: Regular rate and rhythm   Abdomen: soft, non-tender, non-distended, normal bowel sounds  Extremities: no lower extremity edema or calve tenderness, Cheli's sign negative.      LABS:             AM labs pending GYNECOLOGIC ONCOLOGY PROGRESS NOTE    HD2    PROBLEMS:  Excessive vaginal bleeding    Pt seen and examined at bedside. Reports abdominal pain 8/10 this morning without medication. Pt. states she was unaware that she had to ask for her pain medication but will now do so.     SUBJECTIVE:    Denies Nausea, Vomiting or Diarrhea.  Denies shortness of breath, chest pain or dyspnea on exertion.  Tolerating diet.  She denies VB overnight.    OBJECTIVE:     VITALS:  T(F): 97.9 (04-15-19 @ 08:01), Max: 99 (04-15-19 @ 04:13)  HR: 83 (04-15-19 @ 08:01) (68 - 99)  BP: 120/79 (04-15-19 @ 08:01) (100/60 - 120/79)  RR: 18 (04-15-19 @ 08:01) (18 - 20)  SpO2: 100% (04-15-19 @ 08:01) (98% - 100%)      MEDICATIONS  (STANDING):  magnesium citrate Oral Solution 1 Bottle Oral once  magnesium citrate Oral Solution 1 Bottle Oral once  sodium chloride 0.9% lock flush 3 milliLiter(s) IV Push every 8 hours  sodium chloride 0.9%. 1000 milliLiter(s) (125 mL/Hr) IV Continuous <Continuous>    MEDICATIONS  (PRN):  ondansetron Injectable 4 milliGRAM(s) IV Push every 6 hours PRN Nausea and/or Vomiting  oxyCODONE    5 mG/acetaminophen 325 mG 1 Tablet(s) Oral every 4 hours PRN Moderate Pain (4 - 6)  oxyCODONE    5 mG/acetaminophen 325 mG 2 Tablet(s) Oral every 4 hours PRN Severe Pain (7 - 10)      Physical Exam:  Constitutional: NAD  Pulmonary: clear to auscultation bilaterally   Cardiovascular: Regular rate and rhythm   Abdomen: soft, non-tender, non-distended, normal bowel sounds  Extremities: no lower extremity edema or calve tenderness, Cheli's sign negative.      LABS:             AM labs pending

## 2019-04-16 ENCOUNTER — RESULT REVIEW (OUTPATIENT)
Age: 49
End: 2019-04-16

## 2019-04-16 VITALS
RESPIRATION RATE: 15 BRPM | HEART RATE: 89 BPM | TEMPERATURE: 98 F | DIASTOLIC BLOOD PRESSURE: 73 MMHG | SYSTOLIC BLOOD PRESSURE: 125 MMHG | OXYGEN SATURATION: 100 %

## 2019-04-16 LAB
ANION GAP SERPL CALC-SCNC: 11 MMOL/L — SIGNIFICANT CHANGE UP (ref 5–17)
APTT BLD: 29.8 SEC — SIGNIFICANT CHANGE UP (ref 27.5–36.3)
BASOPHILS # BLD AUTO: 0 K/UL — SIGNIFICANT CHANGE UP (ref 0–0.2)
BASOPHILS NFR BLD AUTO: 0.6 % — SIGNIFICANT CHANGE UP (ref 0–2)
BUN SERPL-MCNC: 11 MG/DL — SIGNIFICANT CHANGE UP (ref 8–20)
CALCIUM SERPL-MCNC: 8.6 MG/DL — SIGNIFICANT CHANGE UP (ref 8.6–10.2)
CHLORIDE SERPL-SCNC: 106 MMOL/L — SIGNIFICANT CHANGE UP (ref 98–107)
CO2 SERPL-SCNC: 22 MMOL/L — SIGNIFICANT CHANGE UP (ref 22–29)
CREAT SERPL-MCNC: 0.54 MG/DL — SIGNIFICANT CHANGE UP (ref 0.5–1.3)
EOSINOPHIL # BLD AUTO: 0.1 K/UL — SIGNIFICANT CHANGE UP (ref 0–0.5)
EOSINOPHIL NFR BLD AUTO: 1 % — SIGNIFICANT CHANGE UP (ref 0–6)
GLUCOSE BLDC GLUCOMTR-MCNC: 102 MG/DL — HIGH (ref 70–99)
GLUCOSE BLDC GLUCOMTR-MCNC: 107 MG/DL — HIGH (ref 70–99)
GLUCOSE BLDC GLUCOMTR-MCNC: 98 MG/DL — SIGNIFICANT CHANGE UP (ref 70–99)
GLUCOSE SERPL-MCNC: 104 MG/DL — SIGNIFICANT CHANGE UP (ref 70–115)
HCG UR QL: NEGATIVE — SIGNIFICANT CHANGE UP
HCT VFR BLD CALC: 34.8 % — LOW (ref 37–47)
HGB BLD-MCNC: 10.9 G/DL — LOW (ref 12–16)
INR BLD: 1.03 RATIO — SIGNIFICANT CHANGE UP (ref 0.88–1.16)
LYMPHOCYTES # BLD AUTO: 2 K/UL — SIGNIFICANT CHANGE UP (ref 1–4.8)
LYMPHOCYTES # BLD AUTO: 29.5 % — SIGNIFICANT CHANGE UP (ref 20–55)
MCHC RBC-ENTMCNC: 28.3 PG — SIGNIFICANT CHANGE UP (ref 27–31)
MCHC RBC-ENTMCNC: 31.3 G/DL — LOW (ref 32–36)
MCV RBC AUTO: 90.4 FL — SIGNIFICANT CHANGE UP (ref 81–99)
MONOCYTES # BLD AUTO: 0.3 K/UL — SIGNIFICANT CHANGE UP (ref 0–0.8)
MONOCYTES NFR BLD AUTO: 4.9 % — SIGNIFICANT CHANGE UP (ref 3–10)
NEUTROPHILS # BLD AUTO: 4.3 K/UL — SIGNIFICANT CHANGE UP (ref 1.8–8)
NEUTROPHILS NFR BLD AUTO: 63.9 % — SIGNIFICANT CHANGE UP (ref 37–73)
PLATELET # BLD AUTO: 409 K/UL — HIGH (ref 150–400)
POTASSIUM SERPL-MCNC: 3.8 MMOL/L — SIGNIFICANT CHANGE UP (ref 3.5–5.3)
POTASSIUM SERPL-SCNC: 3.8 MMOL/L — SIGNIFICANT CHANGE UP (ref 3.5–5.3)
PROTHROM AB SERPL-ACNC: 11.8 SEC — SIGNIFICANT CHANGE UP (ref 10–12.9)
RBC # BLD: 3.85 M/UL — LOW (ref 4.4–5.2)
RBC # FLD: 15.3 % — SIGNIFICANT CHANGE UP (ref 11–15.6)
SODIUM SERPL-SCNC: 139 MMOL/L — SIGNIFICANT CHANGE UP (ref 135–145)
WBC # BLD: 6.8 K/UL — SIGNIFICANT CHANGE UP (ref 4.8–10.8)
WBC # FLD AUTO: 6.8 K/UL — SIGNIFICANT CHANGE UP (ref 4.8–10.8)

## 2019-04-16 PROCEDURE — 80048 BASIC METABOLIC PNL TOTAL CA: CPT

## 2019-04-16 PROCEDURE — 36430 TRANSFUSION BLD/BLD COMPNT: CPT

## 2019-04-16 PROCEDURE — 88307 TISSUE EXAM BY PATHOLOGIST: CPT | Mod: 26

## 2019-04-16 PROCEDURE — 58573 TLH W/T/O UTERUS OVER 250 G: CPT

## 2019-04-16 PROCEDURE — 84702 CHORIONIC GONADOTROPIN TEST: CPT

## 2019-04-16 PROCEDURE — 85027 COMPLETE CBC AUTOMATED: CPT

## 2019-04-16 PROCEDURE — 85730 THROMBOPLASTIN TIME PARTIAL: CPT

## 2019-04-16 PROCEDURE — 58573 TLH W/T/O UTERUS OVER 250 G: CPT | Mod: 80

## 2019-04-16 PROCEDURE — 81025 URINE PREGNANCY TEST: CPT

## 2019-04-16 PROCEDURE — 86901 BLOOD TYPING SEROLOGIC RH(D): CPT

## 2019-04-16 PROCEDURE — 36415 COLL VENOUS BLD VENIPUNCTURE: CPT

## 2019-04-16 PROCEDURE — 86923 COMPATIBILITY TEST ELECTRIC: CPT

## 2019-04-16 PROCEDURE — 82962 GLUCOSE BLOOD TEST: CPT

## 2019-04-16 PROCEDURE — S2900: CPT

## 2019-04-16 PROCEDURE — 85610 PROTHROMBIN TIME: CPT

## 2019-04-16 PROCEDURE — 52000 CYSTOURETHROSCOPY: CPT | Mod: 59

## 2019-04-16 PROCEDURE — 99285 EMERGENCY DEPT VISIT HI MDM: CPT

## 2019-04-16 PROCEDURE — T1013: CPT

## 2019-04-16 PROCEDURE — 80053 COMPREHEN METABOLIC PANEL: CPT

## 2019-04-16 PROCEDURE — 86900 BLOOD TYPING SEROLOGIC ABO: CPT

## 2019-04-16 PROCEDURE — 93005 ELECTROCARDIOGRAM TRACING: CPT

## 2019-04-16 PROCEDURE — 88307 TISSUE EXAM BY PATHOLOGIST: CPT

## 2019-04-16 PROCEDURE — 86850 RBC ANTIBODY SCREEN: CPT

## 2019-04-16 PROCEDURE — P9016: CPT

## 2019-04-16 RX ORDER — ENOXAPARIN SODIUM 100 MG/ML
40 INJECTION SUBCUTANEOUS ONCE
Qty: 0 | Refills: 0 | Status: COMPLETED | OUTPATIENT
Start: 2019-04-16 | End: 2019-04-16

## 2019-04-16 RX ORDER — OXYCODONE AND ACETAMINOPHEN 5; 325 MG/1; MG/1
1 TABLET ORAL EVERY 4 HOURS
Qty: 0 | Refills: 0 | Status: DISCONTINUED | OUTPATIENT
Start: 2019-04-16 | End: 2019-04-16

## 2019-04-16 RX ORDER — ONDANSETRON 8 MG/1
4 TABLET, FILM COATED ORAL ONCE
Qty: 0 | Refills: 0 | Status: COMPLETED | OUTPATIENT
Start: 2019-04-16 | End: 2019-04-16

## 2019-04-16 RX ORDER — FENTANYL CITRATE 50 UG/ML
50 INJECTION INTRAVENOUS
Qty: 0 | Refills: 0 | Status: DISCONTINUED | OUTPATIENT
Start: 2019-04-16 | End: 2019-04-16

## 2019-04-16 RX ORDER — OXYCODONE AND ACETAMINOPHEN 5; 325 MG/1; MG/1
2 TABLET ORAL EVERY 4 HOURS
Qty: 0 | Refills: 0 | Status: DISCONTINUED | OUTPATIENT
Start: 2019-04-16 | End: 2019-04-16

## 2019-04-16 RX ORDER — SODIUM CHLORIDE 9 MG/ML
1000 INJECTION, SOLUTION INTRAVENOUS
Qty: 0 | Refills: 0 | Status: DISCONTINUED | OUTPATIENT
Start: 2019-04-16 | End: 2019-04-16

## 2019-04-16 RX ADMIN — SODIUM CHLORIDE 125 MILLILITER(S): 9 INJECTION, SOLUTION INTRAVENOUS at 07:22

## 2019-04-16 RX ADMIN — ONDANSETRON 4 MILLIGRAM(S): 8 TABLET, FILM COATED ORAL at 14:12

## 2019-04-16 RX ADMIN — SODIUM CHLORIDE 3 MILLILITER(S): 9 INJECTION INTRAMUSCULAR; INTRAVENOUS; SUBCUTANEOUS at 05:13

## 2019-04-16 RX ADMIN — SODIUM CHLORIDE 125 MILLILITER(S): 9 INJECTION, SOLUTION INTRAVENOUS at 00:53

## 2019-04-16 RX ADMIN — OXYCODONE AND ACETAMINOPHEN 1 TABLET(S): 5; 325 TABLET ORAL at 14:12

## 2019-04-16 RX ADMIN — OXYCODONE AND ACETAMINOPHEN 1 TABLET(S): 5; 325 TABLET ORAL at 15:11

## 2019-04-16 RX ADMIN — ENOXAPARIN SODIUM 40 MILLIGRAM(S): 100 INJECTION SUBCUTANEOUS at 14:16

## 2019-04-16 NOTE — PROCEDURE NOTE - NSPROCDETAILS_GEN_ALL_CORE
blood seen on insertion/dressing applied/flushes easily/secured in place/sterile technique, catheter placed/location identified, draped/prepped, sterile technique used

## 2019-04-16 NOTE — PROGRESS NOTE ADULT - SUBJECTIVE AND OBJECTIVE BOX
GYNECOLOGIC ONCOLOGY PROGRESS NOTE    POD# 0    PROBLEMS:  Uterine fibroid  Excessive vaginal bleeding      Pt seen and examined at bedside. [employer]  used Id [id number]    SUBJECTIVE:    Patient is without complaints.  Pain is 10/10 without medication.  Denies Nausea, Vomiting or Diarrhea.  Denies shortness of breath, chest pain or dyspnea on exertion.  Willing to try eating/drinking.    OBJECTIVE:     VITALS:  T(F): 98.8 (04-16-19 @ 10:22), Max: 98.8 (04-16-19 @ 10:22)  HR: 67 (04-16-19 @ 13:39) (67 - 87)  BP: 135/61 (04-16-19 @ 13:39) (108/77 - 139/97)  RR: 17 (04-16-19 @ 13:39) (14 - 19)  SpO2: 99% (04-16-19 @ 13:39) (98% - 100%)    I&O's Summary    16 Apr 2019 07:01  -  16 Apr 2019 14:12  --------------------------------------------------------  IN: 0 mL / OUT: 600 mL / NET: -600 mL  600 cc in 2 hours.      MEDICATIONS  (STANDING):  enoxaparin Injectable 40 milliGRAM(s) SubCutaneous once  lactated ringers. 1000 milliLiter(s) (150 mL/Hr) IV Continuous <Continuous>    MEDICATIONS  (PRN):  fentaNYL    Injectable 50 MICROGram(s) IV Push every 5 minutes PRN Severe Pain (7 - 10)  ondansetron Injectable 4 milliGRAM(s) IV Push once PRN Nausea and/or Vomiting  oxyCODONE    5 mG/acetaminophen 325 mG 1 Tablet(s) Oral every 4 hours PRN Moderate Pain (4 - 6)  oxyCODONE    5 mG/acetaminophen 325 mG 1 Tablet(s) Oral every 4 hours PRN Moderate Pain (4 - 6)  oxyCODONE    5 mG/acetaminophen 325 mG 2 Tablet(s) Oral every 4 hours PRN Severe Pain (7 - 10)      Physical Exam:  Constitutional: NAD  Pulmonary: clear to auscultation bilaterally   Cardiovascular: Regular rate and rhythm   Abdomen: soft, non-tender, non-distended, normal bowel sounds.  Extremities: no lower extremity edema or calve tenderness, Cheli's sign negative.  Incision: Clean, dry, op sites intact.  Without signs of infection or hernia.      LABS:                        10.9   6.8   )-----------( 409      ( 16 Apr 2019 08:52 )             34.8     04-16    139  |  106  |  11.0  ----------------------------<  104  3.8   |  22.0  |  0.54    Ca    8.6      16 Apr 2019 08:52    TPro  7.1  /  Alb  4.0  /  TBili  0.3<L>  /  DBili  x   /  AST  25  /  ALT  29  /  AlkPhos  63  04-15    PT/INR - ( 16 Apr 2019 08:52 )   PT: 11.8 sec;   INR: 1.03 ratio         PTT - ( 16 Apr 2019 08:52 )  PTT:29.8 sec      RADIOLOGY & ADDITIONAL TESTS:

## 2019-04-16 NOTE — DISCHARGE NOTE PROVIDER - NSDCFUADDINST_GEN_ALL_CORE_FT
May walk and climb stairs as often as you'd like, no vigorous activity, do not lift anything greater than 10lbs, nothing per vagina x 6 weeks, do not drive while on pain medication.

## 2019-04-16 NOTE — PROGRESS NOTE ADULT - PROBLEM SELECTOR PLAN 1
Pain 10/10, will reassess within the hour.  Likely to be discharge home Pain 10/10, will reassess within the hour.  [id number] x 1 and SCD's for DVT prophylaxis  Likely to be discharge home Pain 10/10, will reassess within the hour.  Continue percocet and naproxen for pain control.  [id number] x 1 and SCD's for DVT prophylaxis  Ambulate as tolerated  Encourage IS use  Regular diet as tolerated  Likely to be discharged, discharge instructions discussed with the patient, as she is voiding, ambulating.    Above plan discussed with Dr. Copeland.

## 2019-04-16 NOTE — PROGRESS NOTE ADULT - REASON FOR ADMISSION
abdominal pain and vaginal bleeding

## 2019-04-16 NOTE — DISCHARGE NOTE PROVIDER - DISCHARGE DATE
No needs at this time. Patient resting. Bed low locked, SRx2, call bell within reach, Non skid socks on, Bed alarm activated     16-Apr-2019

## 2019-04-16 NOTE — DISCHARGE NOTE PROVIDER - CARE PROVIDER_API CALL
Steven Copeland)  Gynecologic Oncology; Obstetrics and Gynecology  [address] [place] [address]  Phone: [phone]  Fax: [phone]  Follow Up Time:

## 2019-04-16 NOTE — DISCHARGE NOTE PROVIDER - NSDCFUADDAPPT_GEN_ALL_CORE_FT
Please follow-up with PA in one week for post-op visit/removal of sutures.  Follow-up with Dr. Copeland in two weeks to review pathology report.

## 2019-04-16 NOTE — DISCHARGE NOTE PROVIDER - HOSPITAL COURSE
Patient post-operatively had an uncomplicated hospital course. Her pain was well controlled. She is tolerating a regular diet. She is ambulating independently. She was able to void after removal of ortega. Patient with flatus. Labs and Vitals WNL upon discharge.

## 2019-04-19 PROBLEM — N92.0 EXCESSIVE AND FREQUENT MENSTRUATION WITH REGULAR CYCLE: Chronic | Status: ACTIVE | Noted: 2019-04-14

## 2019-04-19 PROBLEM — D25.9 LEIOMYOMA OF UTERUS, UNSPECIFIED: Chronic | Status: ACTIVE | Noted: 2019-04-14

## 2019-04-19 LAB — SURGICAL PATHOLOGY STUDY: SIGNIFICANT CHANGE UP

## 2019-04-22 ENCOUNTER — APPOINTMENT (OUTPATIENT)
Dept: GYNECOLOGIC ONCOLOGY | Facility: CLINIC | Age: 49
End: 2019-04-22
Payer: SELF-PAY

## 2019-04-22 VITALS
HEIGHT: 65 IN | SYSTOLIC BLOOD PRESSURE: 119 MMHG | OXYGEN SATURATION: 99 % | RESPIRATION RATE: 16 BRPM | HEART RATE: 94 BPM | WEIGHT: 200 LBS | BODY MASS INDEX: 33.32 KG/M2 | DIASTOLIC BLOOD PRESSURE: 76 MMHG

## 2019-04-22 DIAGNOSIS — Z98.890 OTHER SPECIFIED POSTPROCEDURAL STATES: ICD-10-CM

## 2019-04-22 PROCEDURE — 99024 POSTOP FOLLOW-UP VISIT: CPT

## 2019-04-22 NOTE — REASON FOR VISIT
[Post Op] : post op visit [de-identified] : 4/16/19 [de-identified] : MARTHA RILEY BS, cystoscopy [de-identified] : Pt feels well overall. Denies fever, cp, sob or calf pain. Pain is well controlled. Denies VB. Bowel and bladder function is normal.

## 2019-04-22 NOTE — DISCUSSION/SUMMARY
[Clean] : was clean [Dry] : was dry [Intact] : was intact [Sutures Intact] : had sutures  intact [Normal Skin] : normal appearance [None] : had no drainage [Normal Skin Turgor] : normal skin turgor [Doing Well] : is doing well [No Sign of Infection] : is showing no signs of infection [Excellent Pain Control] : has excellent pain control [Remove Sutures/Staples] : removed sutures/staples [Apply Steristrip] : apply steristrip [Erythema] : was not erythematous [Ecchymosis] : was not ecchymotic [Tender] : nontender [Firm] : soft [Abnormal Bowel Sounds] : normal bowel sounds [de-identified] : Lungs-CTA b/l, CV-NSR, S1 S2  [de-identified] : Remove steris in 24 hours

## 2019-05-02 ENCOUNTER — APPOINTMENT (OUTPATIENT)
Dept: GYNECOLOGIC ONCOLOGY | Facility: CLINIC | Age: 49
End: 2019-05-02
Payer: SELF-PAY

## 2019-05-02 DIAGNOSIS — N85.9 NONINFLAMMATORY DISORDER OF UTERUS, UNSPECIFIED: ICD-10-CM

## 2019-05-02 PROCEDURE — 99024 POSTOP FOLLOW-UP VISIT: CPT

## 2019-05-02 NOTE — DISCUSSION/SUMMARY
[Clean] : was clean [Intact] : was intact [Dry] : was dry [None] : had no drainage [Normal Skin] : normal appearance [Reviewed] : reviewed [Doing Well] : is doing well [Findings] : These findings were discussed with [unfilled] in detail. She understood and accepted the rationale for this recommendation and also understood the serious impact that these findings could have upon her prognosis for survival. [Tender] : nontender [Firm] : soft [Rebound] : no rebound tenderness [Abnormal Bowel Sounds] : normal bowel sounds [FreeTextEntry1] : Pertinent findings revealed globular enlarged uterus (>250 gm), normal tubes and ovaries, normal upper abdomen normal cystoscopy. Pathology benign.  [Guarding] : no guarding

## 2019-05-02 NOTE — ASSESSMENT
[FreeTextEntry1] : In light of the patient's benign findings and excellent recovery, I see no further need for this patient to be followed by a gynecologic oncologist.  I will be discharging the patient today from my practice with instructions to follow up with her gynecologist in 1 month to resume routine gynecologic care.  The patient know that should any further gynecologic symptoms should occur, she may return to see me.\par

## 2019-05-02 NOTE — END OF VISIT
[FreeTextEntry3] : Written by Fina Frausto, acting as a scribe for Dr. Steven Copeland \par This note accurately reflects the work and decisions made by me.

## 2019-05-02 NOTE — REASON FOR VISIT
[Post Op] : post op visit [de-identified] : Robot Assisted Total Laparoscopic Hysterectomy, Bilateral Salpingectomy, Cystoscopy  [de-identified] : 4/16/2019

## 2019-06-03 ENCOUNTER — RESULT REVIEW (OUTPATIENT)
Age: 49
End: 2019-06-03

## 2019-06-05 ENCOUNTER — OUTPATIENT (OUTPATIENT)
Dept: OUTPATIENT SERVICES | Facility: HOSPITAL | Age: 49
LOS: 1 days | End: 2019-06-05
Payer: COMMERCIAL

## 2019-06-05 ENCOUNTER — APPOINTMENT (OUTPATIENT)
Dept: MAMMOGRAPHY | Facility: CLINIC | Age: 49
End: 2019-06-05
Payer: COMMERCIAL

## 2019-06-05 DIAGNOSIS — Z90.49 ACQUIRED ABSENCE OF OTHER SPECIFIED PARTS OF DIGESTIVE TRACT: Chronic | ICD-10-CM

## 2019-06-05 DIAGNOSIS — Z01.419 ENCOUNTER FOR GYNECOLOGICAL EXAMINATION (GENERAL) (ROUTINE) WITHOUT ABNORMAL FINDINGS: ICD-10-CM

## 2019-06-05 DIAGNOSIS — N76.0 ACUTE VAGINITIS: ICD-10-CM

## 2019-06-05 PROCEDURE — 77067 SCR MAMMO BI INCL CAD: CPT

## 2019-06-05 PROCEDURE — 77063 BREAST TOMOSYNTHESIS BI: CPT | Mod: 26

## 2019-06-05 PROCEDURE — 77063 BREAST TOMOSYNTHESIS BI: CPT

## 2019-06-05 PROCEDURE — 77067 SCR MAMMO BI INCL CAD: CPT | Mod: 26

## 2019-07-15 ENCOUNTER — EMERGENCY (EMERGENCY)
Facility: HOSPITAL | Age: 49
LOS: 0 days | Discharge: HOME | End: 2019-07-15
Attending: EMERGENCY MEDICINE | Admitting: EMERGENCY MEDICINE
Payer: MEDICAID

## 2019-07-15 VITALS
OXYGEN SATURATION: 95 % | SYSTOLIC BLOOD PRESSURE: 141 MMHG | TEMPERATURE: 98 F | RESPIRATION RATE: 16 BRPM | HEART RATE: 100 BPM | DIASTOLIC BLOOD PRESSURE: 96 MMHG

## 2019-07-15 VITALS — HEIGHT: 68 IN | WEIGHT: 210.1 LBS

## 2019-07-15 DIAGNOSIS — Z90.49 ACQUIRED ABSENCE OF OTHER SPECIFIED PARTS OF DIGESTIVE TRACT: Chronic | ICD-10-CM
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
## 2019-07-15 DIAGNOSIS — R20.2 PARESTHESIA OF SKIN: ICD-10-CM
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
## 2019-07-15 DIAGNOSIS — M25.532 PAIN IN LEFT WRIST: ICD-10-CM

## 2019-07-15 PROCEDURE — 99283 EMERGENCY DEPT VISIT LOW MDM: CPT

## 2019-07-15 RX ORDER — IBUPROFEN 200 MG
600 TABLET ORAL ONCE
Refills: 0 | Status: COMPLETED | OUTPATIENT
Start: 2019-07-15 | End: 2019-07-15

## 2019-07-15 RX ADMIN — Medication 600 MILLIGRAM(S): at 20:04

## 2019-07-15 NOTE — ED STATDOCS - NS_ ATTENDINGSCRIBEDETAILS _ED_A_ED_FT
I, Lizy Walls MD, performed the initial face to face bedside interview with this patient regarding history of present illness and determined that the patient should be seen in the main ED.  The history, was documented by the scribe in my presence and I attest to the accuracy of the documentation.

## 2019-07-15 NOTE — ED PROVIDER NOTE - CARE PROVIDER_API CALL
Davian Del Valle)  Orthopaedic Surgery  [address], [address]  [place] [address]  Phone: [phone]  Fax: [phone]  Follow Up Time: 4-6 Days

## 2019-07-15 NOTE — ED STATDOCS - PROGRESS NOTE DETAILS
Juanito Carbajal for ED attending Dr. Walls: Call to evaluate pt fopr possible code stroke with numbness of left hand. Sx started last night. Numbness is in left 1st, 2nd fingers. NIH is 0. Numbness is reproduced with tapping on carpal nerve. Not TPA candidate.  used, ID# [id number]. Juanito Carbajal for ED attending Dr. Walls: Call to evaluate pt fopr possible code stroke with numbness of left hand. Sx started last night. Numbness is in left 1st, 2nd fingers. NIH is 0. Numbness is reproduced with tapping on median nerve. Not TPA candidate.  used, ID# [id number].

## 2019-07-15 NOTE — ED PROVIDER NOTE - PHYSICAL EXAMINATION
Constitutional: NAD AAOx3  Eyes: PERRLA EOMI  Head: Normocephalic atraumatic  Mouth: MMM  Cardiac: regular rate   Resp: Lungs CTAB  GI: Abd s/nt/nd  Neuro: CN2-12 intact NIH 0  Skin: No rashes   msk: normal peripheral pulses normal strength sensation coordination + reproducible symptoms with median nerve compression + tinnel and phalen test

## 2019-07-15 NOTE — ED PROVIDER NOTE - NSFOLLOWUPINSTRUCTIONS_ED_ALL_ED_FT
1. return for worsening symptoms or anything concerning to you  2. take all home meds as prescribed  3. follow up with your pmd call to make an appointment  4. wear splint especially at night   5. Take Tylenol 650 mg every 6 hours as needed for pain.  6. Take motrin 600mg PO Q6 hours prn pain  7. follow up with ortho

## 2019-07-15 NOTE — ED PROVIDER NOTE - OBJECTIVE STATEMENT
[id number]  48F no pmhx presents to the ED with left forearm pain and numbness in 1st and 2nd digit - pt states symptoms started yesterday. worse when moving wrist. reproduced when bending at wrist. no trauma. no headache cp sob numbness or weakness in any other part of body. didn't take anything for symptoms. is getting evaluated because son is here getting evaluated for fever.

## 2019-07-15 NOTE — ED ADULT TRIAGE NOTE - CHIEF COMPLAINT QUOTE
Patient reports with left hand forearm pain, reports thumb and index finger are numb since last night. Patient denies injury. Dr Walls evaluated denies code stroke

## 2019-07-15 NOTE — ED PROVIDER NOTE - NS ED ROS FT
Constitutional: No fever or chills  Eyes: No visual changes  HEENT: No throat pain  CV: No chest pain  Resp: No SOB no cough  GI: No abd pain, nausea or vomiting  : No dysuria  MSK: + left wrist discomfort/forearm discomfort/paresthesia in 1st and 2nd digit  Skin: No rash  Neuro: No headache

## 2019-07-15 NOTE — ED ADULT NURSE NOTE - OBJECTIVE STATEMENT
Patient stating she has pain in her left inner forearm and numbness in her left index and thumb fingers since yesterday.  On exam she is able to move her extremity and fingers without issue.  Some swelling noted in the left forearm.

## 2019-07-15 NOTE — ED PROVIDER NOTE - CLINICAL SUMMARY MEDICAL DECISION MAKING FREE TEXT BOX
[id number]  48F no pmhx presents to the ED with left forearm pain and numbness in 1st and 2nd digit - pt states symptoms started yesterday. worse when moving wrist. reproduced when bending at wrist. no trauma. no headache cp sob numbness or weakness in any other part of body. didn't take anything for symptoms. is getting evaluated because son is here getting evaluated for fever. exam with normal peripheral pulses normal strength sensation coordination + reproducible symptoms with median nerve compression + tinnel and phalen test. symptoms and exam consistent with carpel tunnel. pt placed in wrist splint and will d/c with ortho follow up. Gerardo Muse M.D., Attending Physician

## 2019-09-30 NOTE — DISCHARGE NOTE ADULT - ADMISSION DATE +STARTOFVISITDATE
Plan  The patient will continue with her home exercise program.    The patient will continue with their current pain medications. The patient does not need any injections at this time.     She will see the dermatologist about the right distal thigh ski Statement Selected

## 2020-01-31 ENCOUNTER — APPOINTMENT (OUTPATIENT)
Dept: CT IMAGING | Facility: CLINIC | Age: 50
End: 2020-01-31
Payer: COMMERCIAL

## 2020-01-31 ENCOUNTER — OUTPATIENT (OUTPATIENT)
Dept: OUTPATIENT SERVICES | Facility: HOSPITAL | Age: 50
LOS: 1 days | End: 2020-01-31
Payer: COMMERCIAL

## 2020-01-31 DIAGNOSIS — Z90.49 ACQUIRED ABSENCE OF OTHER SPECIFIED PARTS OF DIGESTIVE TRACT: Chronic | ICD-10-CM

## 2020-01-31 DIAGNOSIS — R41.3 OTHER AMNESIA: ICD-10-CM

## 2020-01-31 PROCEDURE — 70450 CT HEAD/BRAIN W/O DYE: CPT | Mod: 26

## 2020-01-31 PROCEDURE — 70450 CT HEAD/BRAIN W/O DYE: CPT

## 2020-02-13 ENCOUNTER — APPOINTMENT (OUTPATIENT)
Dept: GASTROENTEROLOGY | Facility: AMBULATORY MEDICAL SERVICES | Age: 50
End: 2020-02-13
Payer: SELF-PAY

## 2020-02-13 ENCOUNTER — RESULT REVIEW (OUTPATIENT)
Age: 50
End: 2020-02-13

## 2020-02-13 PROCEDURE — 43239 EGD BIOPSY SINGLE/MULTIPLE: CPT

## 2020-06-24 NOTE — ED ADULT NURSE NOTE - NS ED NURSE LEVEL OF CONSCIOUSNESS MENTAL STATUS
on file     Non-medical: Not on file   Tobacco Use    Smoking status: Never Smoker    Smokeless tobacco: Never Used   Substance and Sexual Activity    Alcohol use: Not on file    Drug use: Not on file    Sexual activity: Not on file   Lifestyle    Physical activity     Days per week: Not on file     Minutes per session: Not on file    Stress: Not on file   Relationships    Social connections     Talks on phone: Not on file     Gets together: Not on file     Attends Anabaptism service: Not on file     Active member of club or organization: Not on file     Attends meetings of clubs or organizations: Not on file     Relationship status: Not on file    Intimate partner violence     Fear of current or ex partner: Not on file     Emotionally abused: Not on file     Physically abused: Not on file     Forced sexual activity: Not on file   Other Topics Concern    Not on file   Social History Narrative    Not on file     No past medical history on file. No past surgical history on file. No family history on file. Scribe Attestation:   By signing my name below, I, Cordelia Jones, attest that this documentation has been prepared under the direction and in the presence of Ulices Louie MD.     Electronically Signed: Cordelia Jones. 6/24/20. 10:05 AM EDT. Please note that this chart was generated using voice recognition Dragon dictation software. Although every effort was made to ensure the accuracy of this automated transcription, some errors in transcription may have occurred. Awake/Alert

## 2020-10-19 ENCOUNTER — RESULT REVIEW (OUTPATIENT)
Age: 50
End: 2020-10-19

## 2020-10-19 ENCOUNTER — OUTPATIENT (OUTPATIENT)
Dept: OUTPATIENT SERVICES | Facility: HOSPITAL | Age: 50
LOS: 1 days | End: 2020-10-19
Payer: COMMERCIAL

## 2020-10-19 ENCOUNTER — APPOINTMENT (OUTPATIENT)
Dept: ULTRASOUND IMAGING | Facility: CLINIC | Age: 50
End: 2020-10-19
Payer: COMMERCIAL

## 2020-10-19 ENCOUNTER — APPOINTMENT (OUTPATIENT)
Dept: MAMMOGRAPHY | Facility: CLINIC | Age: 50
End: 2020-10-19
Payer: COMMERCIAL

## 2020-10-19 DIAGNOSIS — Z90.49 ACQUIRED ABSENCE OF OTHER SPECIFIED PARTS OF DIGESTIVE TRACT: Chronic | ICD-10-CM

## 2020-10-19 DIAGNOSIS — Z00.8 ENCOUNTER FOR OTHER GENERAL EXAMINATION: ICD-10-CM

## 2020-10-19 PROCEDURE — 77063 BREAST TOMOSYNTHESIS BI: CPT | Mod: 26

## 2020-10-19 PROCEDURE — 76830 TRANSVAGINAL US NON-OB: CPT

## 2020-10-19 PROCEDURE — 76856 US EXAM PELVIC COMPLETE: CPT | Mod: 26

## 2020-10-19 PROCEDURE — 77067 SCR MAMMO BI INCL CAD: CPT | Mod: 26

## 2020-10-19 PROCEDURE — 76856 US EXAM PELVIC COMPLETE: CPT

## 2020-10-19 PROCEDURE — 77067 SCR MAMMO BI INCL CAD: CPT

## 2020-10-19 PROCEDURE — 76830 TRANSVAGINAL US NON-OB: CPT | Mod: 26

## 2020-10-19 PROCEDURE — 77063 BREAST TOMOSYNTHESIS BI: CPT

## 2020-10-24 ENCOUNTER — APPOINTMENT (OUTPATIENT)
Dept: ULTRASOUND IMAGING | Facility: CLINIC | Age: 50
End: 2020-10-24
Payer: COMMERCIAL

## 2020-10-24 ENCOUNTER — OUTPATIENT (OUTPATIENT)
Dept: OUTPATIENT SERVICES | Facility: HOSPITAL | Age: 50
LOS: 1 days | End: 2020-10-24
Payer: COMMERCIAL

## 2020-10-24 DIAGNOSIS — Z00.8 ENCOUNTER FOR OTHER GENERAL EXAMINATION: ICD-10-CM

## 2020-10-24 DIAGNOSIS — Z90.49 ACQUIRED ABSENCE OF OTHER SPECIFIED PARTS OF DIGESTIVE TRACT: Chronic | ICD-10-CM

## 2020-10-24 PROCEDURE — 76700 US EXAM ABDOM COMPLETE: CPT

## 2020-10-24 PROCEDURE — 76700 US EXAM ABDOM COMPLETE: CPT | Mod: 26

## 2022-07-08 NOTE — H&P ADULT - NSHPSOCIALHISTORY_GEN_ALL_CORE
[Normal] : normoactive bowel sounds, soft and nontender, no hepatosplenomegaly or masses appreciated [de-identified] : pale  , well preserved elderly female , conversant , oriented 4X  , none smoker and denies ETOH and elicit drugs

## 2022-10-11 NOTE — ED ADULT NURSE NOTE - APGAR
· 6/17/2022 CT Abdomen and Pelvis w/ Contrast  FINDINGS:  ABDOMEN   LOWER CHEST:    No clinically significant abnormality identified in the visualized lower chest    LIVER/BILIARY TREE:    Stable 3 mm right anterior hepatic subcentimeter hypodensity present as far back as November 2017 attributed to a cyst  Liver otherwise unremarkable  No duct dilation    GALLBLADDER:    No calcified gallstones  No pericholecystic inflammatory change    SPLEEN:    Stable 2 cm splenic hypodense lesion present as far back as November 2017 compatible with a benign finding  Spleen otherwise unremarkable    PANCREAS:    Unremarkable    ADRENAL GLANDS:    Stable 1 3 cm right adrenal nodule  Stable minimal thickening of the left adrenal gland with punctate calcifications    KIDNEYS/URETERS:    Unremarkable  No hydronephrosis    STOMACH AND BOWEL:    Post partial small bowel resection with unremarkable enteroenteric anastomosis in the right mid abdomen  No bowel obstruction  Single mid sigmoid diverticulum without diverticulitis    APPENDIX:    A normal appendix was visualized    ABDOMINOPELVIC CAVITY:  No ascites  No pneumoperitoneum  No lymphadenopathy  Stable 1 2 cm fat density nodule with soft tissue rim posterior to the lower uterine segment and left lateral to the rectum    VESSELS:    Aortoiliac calcification  No aneurysm    PELVIS   REPRODUCTIVE ORGANS:    Unremarkable for patient's age    URINARY BLADDER:    Unremarkable    ABDOMINAL WALL/INGUINAL REGIONS:    Minimal ventral midline supraumbilical postsurgical scar  Chronic right iliopsoas bursitis    OSSEOUS STRUCTURES:    No acute fracture or osseous destructive lesion identified  Degenerative changes of the spine, pubic symphysis, and multiple joints  Stable minimal grade 1 degenerative retrolisthesis of L5 on S1    IMPRESSION:   No evidence of recurrent or abdominopelvic metastatic disease  1 minute

## 2023-02-02 NOTE — ED PROVIDER NOTE - EYES, MLM
[facility]  [address]  [place] [address]  [phone]          February 2, 2023    Monika Serrano                                                                                                                     [address] [address]            Dear Monika, and To Whom it May Concern,     I evaluated this patient today regarding her sub-optimally controlled blood pressure and we did increase her losartan ( Cozaar ) from 50 to 100 milligrams and she will continue current plan of care   With metoprolol . She will continue with home blood pressure monitoring and we are monitoring with her and will make continued adjustments with medication if necessary,    I really  See no reason not to proceed with whatever dental procedure is recommended. I hope you can go forwards.     Sincerely,         Missael Fair MD     Clear bilaterally, pupils equal, round and reactive to light.

## 2024-07-31 NOTE — BRIEF OPERATIVE NOTE - PROCEDURE POST
<<-----Click on this checkbox to enter Post-Op Dx [No Acute Distress] : no acute distress [Normal Oropharynx] : normal oropharynx [Normal Appearance] : normal appearance [Normal Rate/Rhythm] : normal rate/rhythm [Normal S1, S2] : normal s1, s2 [No Resp Distress] : no resp distress [Clear to Auscultation Bilaterally] : clear to auscultation bilaterally [No Abnormalities] : no abnormalities [Normal Gait] : normal gait [No Clubbing] : no clubbing [No Edema] : no edema [Normal Color/ Pigmentation] : normal color/ pigmentation [No Focal Deficits] : no focal deficits [Oriented x3] : oriented x3 [Normal Affect] : normal affect

## 2024-08-19 NOTE — CONSULT NOTE ADULT - ASSESSMENT
RUQ abd pain  gallstones and gallbladder wall thick   r/o acute cholecystitis      mrcp   hida scan   zosyn  surgical consult room air

## 2024-08-22 NOTE — ED STATDOCS - DR. NAME
called & inform not able to run D dimer due to lipemia. ERMD made aware with new orders. Pt for CTA of chest  
Discharge instructions reviewed with pt, verbalized understanding.   
Rounding Completed. Report received from MARCELO Jameson    Plan of Care reviewed. Waiting for disposition.  Elimination needs assessed.  Provided updates.    Bed is locked and in lowest position. Call light within reach.  
Eli

## 2025-06-10 NOTE — PROGRESS NOTE ADULT - SUBJECTIVE AND OBJECTIVE BOX
Quality 431: Preventive Care And Screening: Unhealthy Alcohol Use - Screening: Patient not identified as an unhealthy alcohol user when screened for unhealthy alcohol use using a systematic screening method
Chief complaint: sore throat, fever  HPI:   48 y/o female with h/o GERD, anemia was admitted on 2/7 fora sore throat and fever x 3 days PTA. The patient had pain inside the mouth for a month PTA, then resolved, then recurred 3 days PTA. Pt also reports fever over 100F at home a couple days PTA. Pt states it was too painful to open her mouth and had difficulty eating. Pt went to [facility] [place] and was given Augmentin for the pain with no improvement. Pt denies chills, nausea, vomiting, SOB PTA. In ED she was given IV ancef, clindamycin and IV decadron.      ENT performed I&D of peritonsillar abscess and aspirated ~2-3cc of pus for culture.     Feels better  Able to eat  No fever or chills    MEDICATIONS  (STANDING):  ampicillin/sulbactam  IVPB 3 Gram(s) IV Intermittent every 6 hours  methylPREDNISolone 24 milliGRAM(s) Oral once  methylPREDNISolone   Oral   sodium chloride 0.9%. 1000 milliLiter(s) (75 mL/Hr) IV Continuous <Continuous>    MEDICATIONS  (PRN):  ibuprofen  Tablet 600 milliGRAM(s) Oral every 6 hours PRN pain  ondansetron Injectable 4 milliGRAM(s) IV Push every 6 hours PRN Nausea and/or Vomiting      Vital Signs Last 24 Hrs  T(C): 36.5 (09 Feb 2018 05:20), Max: 36.6 (08 Feb 2018 21:39)  T(F): 97.7 (09 Feb 2018 05:20), Max: 97.9 (08 Feb 2018 21:39)  HR: 65 (09 Feb 2018 05:20) (65 - 72)  BP: 111/51 (09 Feb 2018 05:20) (111/51 - 123/89)  BP(mean): --  RR: 16 (09 Feb 2018 05:20) (16 - 18)  SpO2: 100% (09 Feb 2018 05:20) (99% - 100%)    Physical Exam:      PHYSICAL EXAM:  Constitutional: comfortable, NAD  HEENT: NC/AT, EOMI, PERRLA, left side peritonsillar swelling, left oropharyngeal erythema  Neck: supple, tenderness to palpation of the left side of neck; edema is improivng  Back: no tenderness  Respiratory: clear  Cardiovascular: S1S2 regular, no murmurs  Abdomen: soft, lower mid abdominal tenderness, not distended, positive BS  Genitourinary: deferred  Rectal: deferred  Musculoskeletal: no muscle tenderness, no joint swelling or tenderness  Extremities: no pedal edema  Neurological: AxOx3, moving all extremities, no focal deficits  Skin: no rashes          LABS:                          12.2   12.6  )-----------( 313      ( 08 Feb 2018 07:24 )             37.3       02-08    139  |  108  |  12  ----------------------------<  124<H>  3.8   |  25  |  0.62    Ca    8.5      08 Feb 2018 07:24    TPro  8.4<H>  /  Alb  3.3  /  TBili  0.3  /  DBili  x   /  AST  45<H>  /  ALT  65  /  AlkPhos  114  02-08          .Blood Blood-Peripheral  01-08 @ 08:42   No growth at 5 days.  --  --        RADIOLOGY:    < from: CT Neck Soft Tissue w/ IV Cont (02.07.18 @ 11:31) >    EXAM:  CT NECK SOFT TISSUE IC                            PROCEDURE DATE:  02/07/2018          INTERPRETATION:  .    CLINICAL INFORMATION: Neck pain and swelling. Possible left-sided abscess.    TECHNIQUE: Axial sections were obtained from the skullbase to the   sternum after the administration of intravenous contrast. 90 cc's of   Omnipaque 350 was administered and 10 cc's were discarded. Sagittal and   Coronal reformations were obtained from the source data.     COMPARISON: No prior CT examinations of the neck are available for   comparison.    FINDINGS: There is a low attenuation rim-enhancing collection within the   left peritonsillar region which measures 2.5 x 2.5 x 2.8 cm. (AP x TRV x   CC). There is surrounding inflammatory change and edema tracking along   the left hypopharynx and also superiorly into the left nasopharynx. The   airway is shifted to the right but remains patent. There are associated   enlarged left-sided cervical lymph nodes which are reactive.    The larynx and trachea are within normal limits.    The bilateral parotid and submandibular glands appear unremarkable.   Thyroid gland is within normal limits.    No acute osseous or vascular abnormalities are imaged. There is minimal   biapical pleural thickening and/or scarring.    Impression: Fairly large left-sided peritonsillar abscess with associated   cervical lymphadenopathy. Patent airway.
PCP: Dr. Fabrice Carson    Chief Complaint/Reason for Admission: sore throat few days and left side face pain x 1 month but worse over past week  HPI:  47y female w/ PMH GERD presents to ED w/ sore throat for past few days.  As per family at bedside translating, she had left side face pain for 1 month which worsened over past week.  She went to see NP Vonnie Hernandez and was prescribed Augmentin on 2/5 w/ no improvement of Sx (and no rashes following augmentin use).   Also c/o difficulty swallowing and opening mouth.  Hasn't been able to eat for days.  Denies fevers.  Took tylenol at home for pain.  No shortness of breath. No dental procedures- she does not have a dentist and she's had dentures for years.  Family states she had similar issue on right side ~5yrs ago.    In ED she was given IV ancef, clindamycin and IV decadron.  ENT performed bedside procedure- I&D of abscess and aspirated ~2-3cc of pus for culture.  Pt reports feeling much better and wants to go home.  Discussed importance of monitoring overnight, IV abx and pending cultures.      2/8: C/o mild pain on swallowing. States she can swallow jello easily.     Review of Systems:  Review of Systems: All other ROS negative unless indicated above.    Allergies: No known allergies     Physical Exam:  ICU Vital Signs Last 24 Hrs  T(C): 36.1 (08 Feb 2018 11:18), Max: 36.9 (08 Feb 2018 05:47)  T(F): 97 (08 Feb 2018 11:18), Max: 98.5 (08 Feb 2018 05:47)  HR: 81 (08 Feb 2018 11:18) (79 - 93)  BP: 119/64 (08 Feb 2018 11:18) (104/55 - 119/64)  BP(mean): --  ABP: --  ABP(mean): --  RR: 20 (08 Feb 2018 11:18) (16 - 20)  SpO2: 99% (08 Feb 2018 05:47) (99% - 100%)    PE:  Constitutional: NAD, laying in bed  HEENT: NC/AT, EOMI, PERRLA. Left side peritonsillar swelling  Neck: supple. TTP on left side  Back: no tenderness  Respiratory: LCTA  Cardiovascular: S1S2 regular, no murmurs  Abdomen: soft, not tender, not distended, positive BS  Genitourinary: voiding  Rectal: deferred  Musculoskeletal: no muscle tenderness, no joint swelling or tenderness  Extremities: no pedal edema   Neurological: no focal deficits    Labs and Results:                        12.2   12.6  )-----------( 313      ( 08 Feb 2018 07:24 )             37.3     02-08    139  |  108  |  12  ----------------------------<  124<H>  3.8   |  25  |  0.62    Ca    8.5      08 Feb 2018 07:24    TPro  8.4<H>  /  Alb  3.3  /  TBili  0.3  /  DBili  x   /  AST  45<H>  /  ALT  65  /  AlkPhos  114  02-08    LIVER FUNCTIONS - ( 08 Feb 2018 07:24 )  Alb: 3.3 g/dL / Pro: 8.4 gm/dL / ALK PHOS: 114 U/L / ALT: 65 U/L / AST: 45 U/L / GGT: x           < from: CT Neck Soft Tissue w/ IV Cont (02.07.18 @ 11:31) >    FINDINGS: There is a low attenuation rim-enhancing collection within the   left peritonsillar region which measures 2.5 x 2.5 x 2.8 cm. (AP x TRV x   CC). There is surrounding inflammatory change and edema tracking along   the left hypopharynx and also superiorly into the left nasopharynx. The   airway is shifted to the right but remains patent. There are associated   enlarged left-sided cervical lymph nodes which are reactive.    The larynx and trachea are within normal limits.    The bilateral parotid and submandibular glands appear unremarkable.   Thyroid gland is within normal limits.    No acute osseous or vascular abnormalities are imaged. There is minimal   biapical pleural thickening and/or scarring.    Impression: Fairly large left-sided peritonsillar abscess with associated   cervical lymphadenopathy. Patent airway.    Dr. Loi Diamond discussed findings with Dr. Mak on 2/7/2018 at 11:25 AM    < end of copied text >      MEDICATIONS  (STANDING):  ampicillin/sulbactam  IVPB 1.5 Gram(s) IV Intermittent every 6 hours  sodium chloride 0.9%. 1000 milliLiter(s) (75 mL/Hr) IV Continuous <Continuous>  vancomycin  IVPB 1000 milliGRAM(s) IV Intermittent once    MEDICATIONS  (PRN):  ondansetron Injectable 4 milliGRAM(s) IV Push every 6 hours PRN Nausea and/or Vomiting
Quality 226: Preventive Care And Screening: Tobacco Use: Screening And Cessation Intervention: Tobacco Screening not Performed
Quality 134: Screening For Clinical Depression And Follow-Up Plan: Depression Screening not documented, reason not given
Detail Level: Detailed